# Patient Record
Sex: FEMALE | Race: WHITE | NOT HISPANIC OR LATINO | Employment: FULL TIME | URBAN - METROPOLITAN AREA
[De-identification: names, ages, dates, MRNs, and addresses within clinical notes are randomized per-mention and may not be internally consistent; named-entity substitution may affect disease eponyms.]

---

## 2017-06-23 ENCOUNTER — OFFICE VISIT (OUTPATIENT)
Dept: FAMILY MEDICINE | Facility: CLINIC | Age: 27
End: 2017-06-23
Payer: COMMERCIAL

## 2017-06-23 VITALS
RESPIRATION RATE: 18 BRPM | OXYGEN SATURATION: 97 % | HEIGHT: 64 IN | WEIGHT: 244.8 LBS | HEART RATE: 74 BPM | DIASTOLIC BLOOD PRESSURE: 89 MMHG | SYSTOLIC BLOOD PRESSURE: 135 MMHG | BODY MASS INDEX: 41.79 KG/M2 | TEMPERATURE: 97.8 F

## 2017-06-23 DIAGNOSIS — Z30.41 ENCOUNTER FOR SURVEILLANCE OF CONTRACEPTIVE PILLS: ICD-10-CM

## 2017-06-23 DIAGNOSIS — J30.2 SEASONAL ALLERGIC RHINITIS, UNSPECIFIED ALLERGIC RHINITIS TRIGGER: ICD-10-CM

## 2017-06-23 DIAGNOSIS — B37.2 YEAST INFECTION OF THE SKIN: ICD-10-CM

## 2017-06-23 DIAGNOSIS — Z12.4 SCREENING FOR MALIGNANT NEOPLASM OF CERVIX: ICD-10-CM

## 2017-06-23 DIAGNOSIS — Z11.3 SCREENING EXAMINATION FOR VENEREAL DISEASE: ICD-10-CM

## 2017-06-23 DIAGNOSIS — E66.9 OBESITY, UNSPECIFIED OBESITY SEVERITY, UNSPECIFIED OBESITY TYPE: ICD-10-CM

## 2017-06-23 DIAGNOSIS — J45.20 MILD INTERMITTENT ASTHMA WITHOUT COMPLICATION: ICD-10-CM

## 2017-06-23 DIAGNOSIS — Z23 NEED FOR VACCINATION: ICD-10-CM

## 2017-06-23 DIAGNOSIS — Z00.00 ENCOUNTER FOR ROUTINE ADULT HEALTH EXAMINATION WITHOUT ABNORMAL FINDINGS: Primary | ICD-10-CM

## 2017-06-23 PROCEDURE — 99212 OFFICE O/P EST SF 10 MIN: CPT | Mod: 25 | Performed by: PHYSICIAN ASSISTANT

## 2017-06-23 PROCEDURE — 90715 TDAP VACCINE 7 YRS/> IM: CPT | Performed by: PHYSICIAN ASSISTANT

## 2017-06-23 PROCEDURE — G0145 SCR C/V CYTO,THINLAYER,RESCR: HCPCS | Performed by: PHYSICIAN ASSISTANT

## 2017-06-23 PROCEDURE — 99395 PREV VISIT EST AGE 18-39: CPT | Mod: 25 | Performed by: PHYSICIAN ASSISTANT

## 2017-06-23 PROCEDURE — 90471 IMMUNIZATION ADMIN: CPT | Performed by: PHYSICIAN ASSISTANT

## 2017-06-23 PROCEDURE — 87591 N.GONORRHOEAE DNA AMP PROB: CPT | Performed by: PHYSICIAN ASSISTANT

## 2017-06-23 PROCEDURE — 87491 CHLMYD TRACH DNA AMP PROBE: CPT | Performed by: PHYSICIAN ASSISTANT

## 2017-06-23 RX ORDER — LEVONORGESTREL/ETHIN.ESTRADIOL 0.1-0.02MG
TABLET ORAL
Qty: 28 TABLET | Refills: 11 | Status: SHIPPED | OUTPATIENT
Start: 2017-06-23 | End: 2018-05-25

## 2017-06-23 RX ORDER — ALBUTEROL SULFATE 90 UG/1
2 AEROSOL, METERED RESPIRATORY (INHALATION) EVERY 4 HOURS PRN
Qty: 1 INHALER | Refills: 3 | Status: SHIPPED | OUTPATIENT
Start: 2017-06-23 | End: 2018-05-25

## 2017-06-23 RX ORDER — MONTELUKAST SODIUM 10 MG/1
10 TABLET ORAL AT BEDTIME
Qty: 90 TABLET | Refills: 0 | Status: SHIPPED | OUTPATIENT
Start: 2017-06-23 | End: 2017-10-14

## 2017-06-23 NOTE — PROGRESS NOTES
SUBJECTIVE:     CC: Germania Alcala is an 26 year old woman who presents for preventive health visit.     Healthy Habits:    Do you get at least three servings of calcium containing foods daily (dairy, green leafy vegetables, etc.)? Yes, sometimes.    Amount of exercise or daily activities, outside of work: 3-4 day(s) per week    Problems taking medications regularly No    Medication side effects: No    Have you had an eye exam in the past two years? yes    Do you see a dentist twice per year? yes    Do you have sleep apnea, excessive snoring or daytime drowsiness?no        Additional concern:     Needs OCP refills:  Taking Aviene daily without SE, no breaks in medication, needs refill.      Allergies:  Taking zyrtec daily with only partial relief, continues to have congestion, itchy/watery eyes, sneezing.  Interested in new medication.  Has tried flonase in the past with some relief of congestion.  Has good control of her asthma with albuterol but feels that summer symptoms are worse due to allergens      Today's PHQ-2 Score:   PHQ-2 ( 1999 Pfizer) 6/23/2017 8/5/2016   Q1: Little interest or pleasure in doing things 0 0   Q2: Feeling down, depressed or hopeless 0 0   PHQ-2 Score 0 0       Abuse: Current or Past(Physical, Sexual or Emotional)- No  Do you feel safe in your environment - Yes    Social History   Substance Use Topics     Smoking status: Never Smoker     Smokeless tobacco: Never Used     Alcohol use No     The patient does not drink >3 drinks per day nor >7 drinks per week.    No results for input(s): CHOL, HDL, LDL, TRIG, CHOLHDLRATIO, NHDL in the last 11329 hours.    Reviewed orders with patient.  Reviewed health maintenance and updated orders accordingly - Yes    Mammo Decision Support:  Mammogram not appropriate for this patient based on age.    Pertinent mammograms are reviewed under the imaging tab.  History of abnormal Pap smear: NO - age 21-29 PAP every 3 years recommended    Reviewed and updated  as needed this visit by clinical staff  Tobacco  Allergies  Meds         Reviewed and updated as needed this visit by Provider        Past Medical History:   Diagnosis Date     Allergic rhinitis due to animal dander      Allergy to mold spores     7/8/13 IgE tests pos. only for cat/M (all other environmental allergens NEGATIVE), NEGATIVE IgE for shellfish (crab, lobster, shrimp).     Diagnostic skin and sensitization tests 7/8/13 IgE tests pos. only for cat/M (all other environmental allergens NEGATIVE), NEGATIVE IgE for shellfish (crab, lobster, shrimp).     Intermittent asthma 5/3/2011    follows with primary care.      No past surgical history on file.  Obstetric History     No data available          ROS:  C: NEGATIVE for fever, chills, change in weight  I: NEGATIVE for worrisome rashes, moles or lesions  E: NEGATIVE for vision changes or irritation  ENT: NEGATIVE for ear, mouth and throat problems  R: NEGATIVE for significant cough or SOB  B: NEGATIVE for masses, tenderness or discharge  CV: NEGATIVE for chest pain, palpitations or peripheral edema  GI: NEGATIVE for nausea, abdominal pain, heartburn, or change in bowel habits  : NEGATIVE for unusual urinary or vaginal symptoms. Periods are regular.  M: NEGATIVE for significant arthralgias or myalgia  N: NEGATIVE for weakness, dizziness or paresthesias  P: NEGATIVE for changes in mood or affect    Patient Active Problem List   Diagnosis     Intermittent asthma     Fracture of lateral malleolus of left ankle     Allergy to mold spores     Allergic rhinitis due to animal dander     Diagnostic skin and sensitization tests(aka ALLERGENS)     No past surgical history on file.    Social History   Substance Use Topics     Smoking status: Never Smoker     Smokeless tobacco: Never Used     Alcohol use No     Family History   Problem Relation Age of Onset     Hypertension Father      Asthma Maternal Grandmother      Cancer - colorectal Maternal Grandfather       "C.A.D. No family hx of      DIABETES No family hx of      CEREBROVASCULAR DISEASE No family hx of      Breast Cancer No family hx of      Prostate Cancer No family hx of          Current Outpatient Prescriptions   Medication Sig Dispense Refill     albuterol (PROAIR HFA/PROVENTIL HFA/VENTOLIN HFA) 108 (90 BASE) MCG/ACT Inhaler Inhale 2 puffs into the lungs every 4 hours as needed for shortness of breath / dyspnea 1 Inhaler 3     levonorgestrel-ethinyl estradiol (AVIANE,ALESSE,LESSINA) 0.1-20 MG-MCG per tablet Take 1 tablet po daily 28 tablet 11     montelukast (SINGULAIR) 10 MG tablet Take 1 tablet (10 mg) by mouth At Bedtime 90 tablet 0     cetirizine (ZYRTEC) 10 MG tablet Take 10 mg by mouth daily.       [DISCONTINUED] levonorgestrel-ethinyl estradiol (AVIANE,ALESSE,LESSINA) 0.1-20 MG-MCG per tablet Take 1 tablet by mouth daily PLEASE SCHEDULE AN APPOINTMENT TO RECEIVE FUTURE REFILLS [phone] 28 tablet 11     [DISCONTINUED] albuterol (PROAIR HFA, PROVENTIL HFA, VENTOLIN HFA) 108 (90 BASE) MCG/ACT inhaler Inhale 2 puffs into the lungs every 4 hours as needed for shortness of breath / dyspnea 1 Inhaler 3     Allergies   Allergen Reactions     Cats      Mold      Nkda [No Known Drug Allergies]      No lab results found.   OBJECTIVE:     /89 (BP Location: Left arm, Patient Position: Chair, Cuff Size: Adult Large)  Pulse 74  Temp 97.8  F (36.6  C) (Tympanic)  Resp 18  Ht 5' 4\" (1.626 m)  Wt 244 lb 12.8 oz (111 kg)  LMP 06/09/2017  SpO2 97%  BMI 42.02 kg/m2  EXAM:  GENERAL: healthy, alert and no distress  EYES: Eyes grossly normal to inspection, PERRL and conjunctivae and sclerae normal  HENT: ear canals and TM's normal, nose and mouth without ulcers or lesions  NECK: no adenopathy, no asymmetry, masses, or scars and thyroid normal to palpation  RESP: lungs clear to auscultation - no rales, rhonchi or wheezes  BREAST: normal without masses, tenderness or nipple discharge and no palpable axillary masses " or adenopathy  CV: regular rate and rhythm, normal S1 S2, no S3 or S4, no murmur, click or rub, no peripheral edema and peripheral pulses strong  ABDOMEN: soft, nontender, no hepatosplenomegaly, no masses and bowel sounds normal   (female): skin of inferior labia majora and perineum with diffuse erythema and flaking, satellite papules noted, normal urethral meatus, vaginal mucosa, normal cervix/adnexa/uterus without masses or discharge  MS: no gross musculoskeletal defects noted, no edema  SKIN: no suspicious lesions or rashes  NEURO: Normal strength and tone, mentation intact and speech normal  PSYCH: mentation appears normal, affect normal/bright    ASSESSMENT/PLAN:     1. Encounter for routine adult health examination without abnormal findings    2. Mild intermittent asthma without complication  controlled  - albuterol (PROAIR HFA/PROVENTIL HFA/VENTOLIN HFA) 108 (90 BASE) MCG/ACT Inhaler; Inhale 2 puffs into the lungs every 4 hours as needed for shortness of breath / dyspnea  Dispense: 1 Inhaler; Refill: 3    3. Seasonal allergic rhinitis, unspecified allergic rhinitis trigger  Will add singulare for summer for asthma and allergy,, advise flonase for congestion  - montelukast (SINGULAIR) 10 MG tablet; Take 1 tablet (10 mg) by mouth At Bedtime  Dispense: 90 tablet; Refill: 0    4. Yeast infection of the skin  Rash of perineum and groin noted on examination consistent with skin candidiasis, advise OTC miconazole cream x 7-14 days    5. Encounter for surveillance of contraceptive pills  - levonorgestrel-ethinyl estradiol (AVIANE,ALESSE,LESSINA) 0.1-20 MG-MCG per tablet; Take 1 tablet po daily  Dispense: 28 tablet; Refill: 11    6. Screening for malignant neoplasm of cervix  - Pap imaged thin layer screen only - recommended age 21 - 24 years    7. Screening examination for venereal disease  - Chlamydia trachomatis PCR  - Neisseria gonorrhoeae PCR    8. Need for vaccination  TDAP given      6. Obesity, unspecified  "obesity severity, unspecified obesity type  Plays lacrosse, exercises frequently, will continue to monitor           COUNSELING:   Reviewed preventive health counseling, as reflected in patient instructions       Regular exercise       Healthy diet/nutrition    BP Screening:   Last 3 BP Readings:    BP Readings from Last 3 Encounters:   06/23/17 135/89   08/05/16 127/86   08/12/15 116/74       The following was recommended to the patient:  Re-screen BP within a year and recommended lifestyle modifications     reports that she has never smoked. She has never used smokeless tobacco.    Estimated body mass index is 42.02 kg/(m^2) as calculated from the following:    Height as of this encounter: 5' 4\" (1.626 m).    Weight as of this encounter: 244 lb 12.8 oz (111 kg).   Weight management plan: Discussed healthy diet and exercise guidelines and patient will follow up in 12 months in clinic to re-evaluate.    Counseling Resources:  ATP IV Guidelines  Pooled Cohorts Equation Calculator  Breast Cancer Risk Calculator  FRAX Risk Assessment  ICSI Preventive Guidelines  Dietary Guidelines for Americans, 2010  USDA's MyPlate  ASA Prophylaxis  Lung CA Screening    Jt Ayala PA-C  [facility]  "

## 2017-06-23 NOTE — MR AVS SNAPSHOT
After Visit Summary   6/23/2017    Germania Alcala    MRN: [id number]           Patient Information     Date Of Birth          1990        Visit Information        Provider Department      6/23/2017 1:40 PM Jt Ayala PA-C [facility]        Today's Diagnoses     Encounter for routine adult health examination without abnormal findings    -  1    Mild intermittent asthma without complication        Yeast infection of the skin        Encounter for surveillance of contraceptive pills        Screening for malignant neoplasm of cervix        Need for vaccination        Seasonal allergic rhinitis, unspecified allergic rhinitis trigger        Screening examination for venereal disease          Care Instructions      Preventive Health Recommendations  Female Ages 18 to 25     Yearly exam:     See your health care provider every year in order to  o Review health changes.   o Discuss preventive care.    o Review your medicines if your doctor has prescribed any.      You should be tested each year for STDs (sexually transmitted diseases).       After age 20, talk to your provider about how often you should have cholesterol testing.      Starting at age 21, get a Pap test every three years. If you have an abnormal result, your doctor may have you test more often.      If you are at risk for diabetes, you should have a diabetes test (fasting glucose).     Shots:     Get a flu shot each year.     Get a tetanus shot every 10 years.     Consider getting the shot (vaccine) that prevents cervical cancer (Gardasil).    Nutrition:     Eat at least 5 servings of fruits and vegetables each day.    Eat whole-grain bread, whole-wheat pasta and brown rice instead of white grains and rice.    Talk to your provider about Calcium and Vitamin D.     Lifestyle    Exercise at least 150 minutes a week each week (30 minutes a day, 5 days a week). This will help you control your weight and prevent  "disease.    Limit alcohol to one drink per day.    No smoking.     Wear sunscreen to prevent skin cancer.    See your dentist every six months for an exam and cleaning.          Follow-ups after your visit        Who to contact     If you have questions or need follow up information about today's clinic visit or your schedule please contact [facility] directly at [phone].  Normal or non-critical lab and imaging results will be communicated to you by Crocodochart, letter or phone within 4 business days after the clinic has received the results. If you do not hear from us within 7 days, please contact the clinic through fl3urt or phone. If you have a critical or abnormal lab result, we will notify you by phone as soon as possible.  Submit refill requests through Avansera or call your pharmacy and they will forward the refill request to us. Please allow 3 business days for your refill to be completed.          Additional Information About Your Visit        Crocodochart Information     Avansera gives you secure access to your electronic health record. If you see a primary care provider, you can also send messages to your care team and make appointments. If you have questions, please call your primary care clinic.  If you do not have a primary care provider, please call [phone] and they will assist you.        Care EveryWhere ID     This is your Care EveryWhere ID. This could be used by other organizations to access your [facility] medical records  [id number]        Your Vitals Were     Pulse Temperature Respirations Height Last Period Pulse Oximetry    74 97.8  F (36.6  C) (Tympanic) 18 5' 4\" (1.626 m) 06/09/2017 97%    BMI (Body Mass Index)                   42.02 kg/m2            Blood Pressure from Last 3 Encounters:   06/23/17 135/89   08/05/16 127/86   08/12/15 116/74    Weight from Last 3 Encounters:   06/23/17 244 lb 12.8 oz (111 kg)   08/05/16 234 lb (106.1 kg)   08/12/15 201 lb 3.2 oz (91.3 " kg)              We Performed the Following     Chlamydia trachomatis PCR     Neisseria gonorrhoeae PCR     Pap imaged thin layer screen only - recommended age 21 - 24 years          Today's Medication Changes          These changes are accurate as of: 6/23/17  2:38 PM.  If you have any questions, ask your nurse or doctor.               Start taking these medicines.        Dose/Directions    montelukast 10 MG tablet   Commonly known as:  SINGULAIR   Used for:  Seasonal allergic rhinitis, unspecified allergic rhinitis trigger   Started by:  Jt Ayala PA-C        Dose:  10 mg   Take 1 tablet (10 mg) by mouth At Bedtime   Quantity:  90 tablet   Refills:  0         These medicines have changed or have updated prescriptions.        Dose/Directions    levonorgestrel-ethinyl estradiol 0.1-20 MG-MCG per tablet   Commonly known as:  ROBERT PEREZ LESSINA   This may have changed:    - how much to take  - how to take this  - when to take this  - additional instructions   Used for:  Encounter for surveillance of contraceptive pills   Changed by:  Jt Ayala PA-C        Take 1 tablet po daily   Quantity:  28 tablet   Refills:  11            Where to get your medicines      These medications were sent to [facility] [id number] [facility] - [facility], [place], [place] - [address]  [address] [place] [facility] [place] [address]     Phone:  [phone]     albuterol 108 (90 BASE) MCG/ACT Inhaler    levonorgestrel-ethinyl estradiol 0.1-20 MG-MCG per tablet    montelukast 10 MG tablet                Primary Care Provider Office Phone # Fax #    Jose Maria Janett Manjarrez [phone]       [address] [address]        Equal Access to Services     [facility]: Kem García, waricardo ludereje, qaybtk lopezalkristine curtis, jasper yadav. So [facility] [phone].    ATENCIÓN: Si habla español, tiene a solitario disposición  servicios gratuitos de asistencia lingüística. Shahla barreto [phone].    We comply with applicable federal civil rights laws and [place] laws. We do not discriminate on the basis of race, color, national origin, age, disability sex, sexual orientation or gender identity.            Thank you!     Thank you for choosing [facility] [place] [facility]  for your care. Our goal is always to provide you with excellent care. Hearing back from our patients is one way we can continue to improve our services. Please take a few minutes to complete the written survey that you may receive in the mail after your visit with us. Thank you!             Your Updated Medication List - Protect others around you: Learn how to safely use, store and throw away your medicines at www.disposemymeds.org.          This list is accurate as of: 6/23/17  2:38 PM.  Always use your most recent med list.                   Brand Name Dispense Instructions for use Diagnosis    albuterol 108 (90 BASE) MCG/ACT Inhaler    PROAIR HFA/PROVENTIL HFA/VENTOLIN HFA    1 Inhaler    Inhale 2 puffs into the lungs every 4 hours as needed for shortness of breath / dyspnea    Mild intermittent asthma without complication       cetirizine 10 MG tablet    zyrTEC     Take 10 mg by mouth daily.        levonorgestrel-ethinyl estradiol 0.1-20 MG-MCG per tablet    ROBERT PEREZ LESSINA    28 tablet    Take 1 tablet po daily    Encounter for surveillance of contraceptive pills       montelukast 10 MG tablet    SINGULAIR    90 tablet    Take 1 tablet (10 mg) by mouth At Bedtime    Seasonal allergic rhinitis, unspecified allergic rhinitis trigger

## 2017-06-23 NOTE — NURSING NOTE
"Chief Complaint   Patient presents with     Physical     Not fasting        Initial /89 (BP Location: Left arm, Patient Position: Chair, Cuff Size: Adult Large)  Pulse 74  Temp 97.8  F (36.6  C) (Tympanic)  Resp 18  Ht 5' 4\" (1.626 m)  Wt 244 lb 12.8 oz (111 kg)  LMP 06/09/2017  SpO2 97%  BMI 42.02 kg/m2 Estimated body mass index is 42.02 kg/(m^2) as calculated from the following:    Height as of this encounter: 5' 4\" (1.626 m).    Weight as of this encounter: 244 lb 12.8 oz (111 kg).  Medication Reconciliation: complete    Jane Boyd MA  "

## 2017-06-24 ASSESSMENT — ASTHMA QUESTIONNAIRES: ACT_TOTALSCORE: 23

## 2017-06-25 LAB
C TRACH DNA SPEC QL NAA+PROBE: NORMAL
N GONORRHOEA DNA SPEC QL NAA+PROBE: NORMAL
SPECIMEN SOURCE: NORMAL
SPECIMEN SOURCE: NORMAL

## 2017-06-27 LAB
COPATH REPORT: NORMAL
PAP: NORMAL

## 2017-07-01 DIAGNOSIS — Z30.09 GENERAL COUNSELING FOR PRESCRIPTION OF ORAL CONTRACEPTIVES: ICD-10-CM

## 2017-07-03 RX ORDER — LEVONORGESTREL/ETHIN.ESTRADIOL 0.1-0.02MG
TABLET ORAL
Qty: 28 TABLET | Refills: 11 | OUTPATIENT
Start: 2017-07-03

## 2017-07-03 NOTE — TELEPHONE ENCOUNTER
levonorgestrel-ethinyl estradiol (AVIANE,ROBERT,LESSINA) 0.1-20 MG-MCG per tablet      Last Written Prescription Date: 6/23/2017  Last Fill Quantity: 28, # refills: 11  Last Office Visit with [facility], [facility] or [facility] prescribing provider: 6/23/2017       BP Readings from Last 3 Encounters:   06/23/17 135/89   08/05/16 127/86   08/12/15 116/74     Date of last Breast Exam: 6/23/2017

## 2017-07-03 NOTE — TELEPHONE ENCOUNTER
Duplicate- sent 06/23/17- info sent to pharmacy.  Ruby Salvador,RN  [facility]  [phone]

## 2017-10-14 DIAGNOSIS — J30.2 SEASONAL ALLERGIC RHINITIS: ICD-10-CM

## 2017-10-16 RX ORDER — MONTELUKAST SODIUM 10 MG/1
TABLET ORAL
Qty: 90 TABLET | Refills: 2 | Status: SHIPPED | OUTPATIENT
Start: 2017-10-16 | End: 2018-05-25

## 2017-10-16 NOTE — TELEPHONE ENCOUNTER
Prescription approved per [facility], [facility] or [facility] refill protocol.  Zuleyka Marinelli RN - Triage  [facility]

## 2017-10-16 NOTE — TELEPHONE ENCOUNTER
Singulair       Last Written Prescription Date: 6/23/17  Last Fill Quantity: 90, # refills: 0    Last Office Visit with [facility], P or [facility] prescribing provider:  6/23/17   Future Office Visit:       Date of Last Asthma Action Plan Letter:   Asthma Action Plan Q1 Year    Topic Date Due     Asthma Action Plan - yearly  08/12/2016      Asthma Control Test:   ACT Total Scores 6/23/2017   ACT TOTAL SCORE -   ASTHMA ER VISITS -   ASTHMA HOSPITALIZATIONS -   ACT TOTAL SCORE (Goal Greater than or Equal to 20) 23   In the past 12 months, how many times did you visit the emergency room for your asthma without being admitted to the hospital? 0   In the past 12 months, how many times were you hospitalized overnight because of your asthma? 0       Date of Last Spirometry Test:   No results found for this or any previous visit.    Perla Pelletier CMA

## 2018-02-13 ENCOUNTER — OFFICE VISIT (OUTPATIENT)
Dept: DERMATOLOGY | Facility: CLINIC | Age: 28
End: 2018-02-13
Payer: COMMERCIAL

## 2018-02-13 VITALS
DIASTOLIC BLOOD PRESSURE: 70 MMHG | SYSTOLIC BLOOD PRESSURE: 136 MMHG | HEIGHT: 64 IN | BODY MASS INDEX: 41.66 KG/M2 | HEART RATE: 78 BPM | WEIGHT: 244 LBS

## 2018-02-13 DIAGNOSIS — L81.4 LENTIGO: ICD-10-CM

## 2018-02-13 DIAGNOSIS — D18.00 ANGIOMA: ICD-10-CM

## 2018-02-13 DIAGNOSIS — D22.9 NEVUS: Primary | ICD-10-CM

## 2018-02-13 PROCEDURE — 99203 OFFICE O/P NEW LOW 30 MIN: CPT | Performed by: PHYSICIAN ASSISTANT

## 2018-02-13 ASSESSMENT — PAIN SCALES - GENERAL: PAINLEVEL: NO PAIN (0)

## 2018-02-13 NOTE — NURSING NOTE
"Chief Complaint   Patient presents with     [facility]     Derm Problem     mole check / full body       Initial /70  Pulse 78  Ht 1.626 m (5' 4\")  Wt 110.7 kg (244 lb)  BMI 41.88 kg/m2 Estimated body mass index is 41.88 kg/(m^2) as calculated from the following:    Height as of this encounter: 1.626 m (5' 4\").    Weight as of this encounter: 110.7 kg (244 lb).  Medication Reconciliation: complete    "

## 2018-02-13 NOTE — PROGRESS NOTES
"HPI:   Germania Alcala is a 27 year old female who presents for Full skin cancer screening.  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   On review of systems, there are no further skin complaints, patient is feeling otherwise well.  See patient intake sheet.  ROS of the following were done and are negative: Constitutional, Eyes, Ears, Nose,   Mouth, Throat, Cardiovascular, Respiratory, GI, Genitourinary, Musculoskeletal,   Psychiatric, Endocrine, Allergic/Immunologic.    This document serves as a record of the services and decisions personally performed and made by Rosalie Camilo, MS, PA-C. It was created on her behalf by Michelle Ervin, a trained medical scribe. The creation of this document is based on the provider's statements to the medical scribe.  Michelle Ervin 3:56 PM February 13, 2018    PHYSICAL EXAM:   /70  Pulse 78  Ht 1.626 m (5' 4\")  Wt 110.7 kg (244 lb)  BMI 41.88 kg/m2   Skin exam performed as follows: Type 2 skin. Mood appropriate  Alert and Oriented X 3. Well developed, well nourished in no distress.  General appearance: Normal  Head including face: Normal  Eyes: conjunctiva and lids: Normal  Mouth: Lips, teeth, gums: Normal  Neck: Normal  Chest-breast/axillae: Normal  Back: Normal  Spleen and liver: Normal  Cardiovascular: Exam of peripheral vascular system by observation for swelling, varicosities, edema: Normal  Genitalia: groin, buttocks: Normal  Extremities: digits/nails (clubbing): Normal  Eccrine and Apocrine glands: Normal  Right upper extremity: Normal  Left upper extremity: " Normal  Right lower extremity: Normal  Left lower extremity: Normal  Skin: Scalp and body hair: See below    Pt deferred exam of breasts, groin, buttocks: No    Other physical findings:  1. Multiple pigmented macules on extremities and trunk  2. Multiple pigmented macules on face, trunk and extremities  3. Multiple vascular papules on trunk, arms and legs       Except as noted above, no other signs of skin cancer or melanoma.     ASSESSMENT/PLAN:   Benign Full skin cancer screening today. . Patient with history of none  Advised on monthly self exams and 1 year  Patient Education: Appropriate brochures given.    Multiple benign appearing nevi on arms, legs and trunk. Discussed ABCDEs of melanoma and sunscreen.   Multiple lentigos on arms, legs and trunk. Advised benign, no treatment needed.  Multiple scattered angiomas. Advised benign, no treatment needed.       Follow-up: Q2-3 years for FSE; PRN sooner     1.) Patient was asked about new and changing moles. YES  2.) Patient received a complete physical skin examination: YES  3.) Patient was counseled to perform a monthly self skin examination: YES  Scribed By: Michelle Ervin Medical Scribe     The information in this document, created by the medical scribe for me, accurately reflects the services I personally performed and the decisions made by me. I have reviewed and approved this document for accuracy prior to leaving the patient care area.  February 13, 2018 4:02 PM    Rosalie Camilo MS, PALatoniaC

## 2018-02-13 NOTE — LETTER
"    2/13/2018         RE: Germania Alcala  [address]   [place] [address]        Dear Colleague,    Thank you for referring your patient, Germania Alcala, to the [facility]. Please see a copy of my visit note below.    HPI:   Germania Alcala is a 27 year old female who presents for Full skin cancer screening.  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   On review of systems, there are no further skin complaints, patient is feeling otherwise well.  See patient intake sheet.  ROS of the following were done and are negative: Constitutional, Eyes, Ears, Nose,   Mouth, Throat, Cardiovascular, Respiratory, GI, Genitourinary, Musculoskeletal,   Psychiatric, Endocrine, Allergic/Immunologic.    This document serves as a record of the services and decisions personally performed and made by Rosalie Camilo, MS, PA-C. It was created on her behalf by Michelle Ervin, a trained medical scribe. The creation of this document is based on the provider's statements to the medical scribe.  Michelle Ervin 3:56 PM February 13, 2018    PHYSICAL EXAM:   /70  Pulse 78  Ht 1.626 m (5' 4\")  Wt 110.7 kg (244 lb)  BMI 41.88 kg/m2   Skin exam performed as follows: Type 2 skin. Mood appropriate  Alert and Oriented X 3. Well developed, well nourished in no distress.  General appearance: Normal  Head including face: Normal  Eyes: conjunctiva and lids: Normal  Mouth: Lips, teeth, gums: Normal  Neck: Normal  Chest-breast/axillae: Normal  Back: Normal  Spleen and liver: Normal  Cardiovascular: Exam of peripheral vascular " system by observation for swelling, varicosities, edema: Normal  Genitalia: groin, buttocks: Normal  Extremities: digits/nails (clubbing): Normal  Eccrine and Apocrine glands: Normal  Right upper extremity: Normal  Left upper extremity: Normal  Right lower extremity: Normal  Left lower extremity: Normal  Skin: Scalp and body hair: See below    Pt deferred exam of breasts, groin, buttocks: No    Other physical findings:  1. Multiple pigmented macules on extremities and trunk  2. Multiple pigmented macules on face, trunk and extremities  3. Multiple vascular papules on trunk, arms and legs       Except as noted above, no other signs of skin cancer or melanoma.     ASSESSMENT/PLAN:   Benign Full skin cancer screening today. . Patient with history of none  Advised on monthly self exams and 1 year  Patient Education: Appropriate brochures given.    Multiple benign appearing nevi on arms, legs and trunk. Discussed ABCDEs of melanoma and sunscreen.   Multiple lentigos on arms, legs and trunk. Advised benign, no treatment needed.  Multiple scattered angiomas. Advised benign, no treatment needed.       Follow-up: Q2-3 years for FSE; PRN sooner     1.) Patient was asked about new and changing moles. YES  2.) Patient received a complete physical skin examination: YES  3.) Patient was counseled to perform a monthly self skin examination: YES  Scribed By: Michelle Ervin Medical Scribe     The information in this document, created by the medical scribe for me, accurately reflects the services I personally performed and the decisions made by me. I have reviewed and approved this document for accuracy prior to leaving the patient care area.  February 13, 2018 4:02 PM    Rosalie Camilo MS, PACELESTE      Again, thank you for allowing me to participate in the care of your patient.        Sincerely,        Rosalie Camilo PA-C

## 2018-02-13 NOTE — MR AVS SNAPSHOT
"              After Visit Summary   2/13/2018    Germania Alcala    MRN: [id number]           Patient Information     Date Of Birth          1990        Visit Information        Provider Department      2/13/2018 4:00 PM Rosalie Camilo PA-C [facility]        Today's Diagnoses     Nevus    -  1    Lentigo        Angioma           Follow-ups after your visit        Who to contact     If you have questions or need follow up information about today's clinic visit or your schedule please contact [facility] directly at [phone].  Normal or non-critical lab and imaging results will be communicated to you by ScoreGridhart, letter or phone within 4 business days after the clinic has received the results. If you do not hear from us within 7 days, please contact the clinic through ScoreGridhart or phone. If you have a critical or abnormal lab result, we will notify you by phone as soon as possible.  Submit refill requests through Vantos or call your pharmacy and they will forward the refill request to us. Please allow 3 business days for your refill to be completed.          Additional Information About Your Visit        MyChart Information     Vantos gives you secure access to your electronic health record. If you see a primary care provider, you can also send messages to your care team and make appointments. If you have questions, please call your primary care clinic.  If you do not have a primary care provider, please call [phone] and they will assist you.        Care EveryWhere ID     This is your Care EveryWhere ID. This could be used by other organizations to access your [facility] medical records  [id number]        Your Vitals Were     Pulse Height BMI (Body Mass Index)             78 1.626 m (5' 4\") 41.88 kg/m2          Blood Pressure from Last 3 Encounters:   02/13/18 136/70   06/23/17 135/89   08/05/16 127/86    Weight from Last 3 Encounters:   02/13/18 110.7 kg " (244 lb)   06/23/17 111 kg (244 lb 12.8 oz)   08/05/16 106.1 kg (234 lb)              Today, you had the following     No orders found for display       Primary Care Provider Office Phone # Fax #    Jose Maria Janett Manjarrez [phone] [address]  [place] [address]        Equal Access to Services     [facility]: Hadii aad ku hadasho Soomaali, waaxda luqadaha, qaybta kaalmada adeegyada, waxay idiin hayaan adeeg kharash la'aan . So [facility] [phone].    ATENCIÓN: Si petty thompson, tiene a solitario disposición servicios gratuitos de asistencia lingüística. Llame al [phone].    We comply with applicable federal civil rights laws and [place] laws. We do not discriminate on the basis of race, color, national origin, age, disability, sex, sexual orientation, or gender identity.            Thank you!     Thank you for choosing [facility]  for your care. Our goal is always to provide you with excellent care. Hearing back from our patients is one way we can continue to improve our services. Please take a few minutes to complete the written survey that you may receive in the mail after your visit with us. Thank you!             Your Updated Medication List - Protect others around you: Learn how to safely use, store and throw away your medicines at www.disposemymeds.org.          This list is accurate as of 2/13/18  5:09 PM.  Always use your most recent med list.                   Brand Name Dispense Instructions for use Diagnosis    albuterol 108 (90 BASE) MCG/ACT Inhaler    PROAIR HFA/PROVENTIL HFA/VENTOLIN HFA    1 Inhaler    Inhale 2 puffs into the lungs every 4 hours as needed for shortness of breath / dyspnea    Mild intermittent asthma without complication       cetirizine 10 MG tablet    zyrTEC     Take 10 mg by mouth daily.        levonorgestrel-ethinyl estradiol 0.1-20 MG-MCG per tablet    ROBERT PEREZ LESSINA    28 tablet    Take 1 tablet po daily     Encounter for surveillance of contraceptive pills       montelukast 10 MG tablet    SINGULAIR    90 tablet    TAKE 1 TABLET (10 MG) BY MOUTH AT BEDTIME    Seasonal allergic rhinitis

## 2018-05-25 ENCOUNTER — OFFICE VISIT (OUTPATIENT)
Dept: FAMILY MEDICINE | Facility: CLINIC | Age: 28
End: 2018-05-25
Payer: COMMERCIAL

## 2018-05-25 VITALS
BODY MASS INDEX: 44.25 KG/M2 | DIASTOLIC BLOOD PRESSURE: 76 MMHG | OXYGEN SATURATION: 97 % | WEIGHT: 259.2 LBS | TEMPERATURE: 97.7 F | RESPIRATION RATE: 16 BRPM | SYSTOLIC BLOOD PRESSURE: 120 MMHG | HEIGHT: 64 IN | HEART RATE: 57 BPM

## 2018-05-25 DIAGNOSIS — Z11.3 SCREEN FOR STD (SEXUALLY TRANSMITTED DISEASE): Primary | ICD-10-CM

## 2018-05-25 DIAGNOSIS — Z30.41 ENCOUNTER FOR SURVEILLANCE OF CONTRACEPTIVE PILLS: ICD-10-CM

## 2018-05-25 DIAGNOSIS — J45.20 MILD INTERMITTENT ASTHMA WITHOUT COMPLICATION: ICD-10-CM

## 2018-05-25 PROCEDURE — 87591 N.GONORRHOEAE DNA AMP PROB: CPT | Performed by: PHYSICIAN ASSISTANT

## 2018-05-25 PROCEDURE — 87491 CHLMYD TRACH DNA AMP PROBE: CPT | Performed by: PHYSICIAN ASSISTANT

## 2018-05-25 PROCEDURE — 99213 OFFICE O/P EST LOW 20 MIN: CPT | Performed by: PHYSICIAN ASSISTANT

## 2018-05-25 RX ORDER — LEVONORGESTREL/ETHIN.ESTRADIOL 0.1-0.02MG
TABLET ORAL
Qty: 28 TABLET | Refills: 11 | Status: SHIPPED | OUTPATIENT
Start: 2018-05-25 | End: 2019-05-05

## 2018-05-25 RX ORDER — ALBUTEROL SULFATE 90 UG/1
2 AEROSOL, METERED RESPIRATORY (INHALATION) EVERY 4 HOURS PRN
Qty: 1 INHALER | Refills: 3 | Status: SHIPPED | OUTPATIENT
Start: 2018-05-25 | End: 2019-07-19

## 2018-05-25 NOTE — PROGRESS NOTES
SUBJECTIVE:   Germania Alcala is a 27 year old female who presents to clinic today for the following health issues:    Medication Followup of Levonorgestrel-ethinyl    Taking Medication as prescribed: yes    Side Effects:  Menstrual cycle has been less than a day or just one day    Medication Helping Symptoms:  yes       Patient taking OCP consistently, no breaks in medication, due for refills at this time        Problem list and histories reviewed & adjusted, as indicated.  Additional history: as documented    Patient Active Problem List   Diagnosis     Intermittent asthma     Fracture of lateral malleolus of left ankle     Allergy to mold spores     Allergic rhinitis due to animal dander     Diagnostic skin and sensitization tests(aka ALLERGENS)     Class 3 obesity without serious comorbidity with body mass index (BMI) of 40.0 to 44.9 in adult (H)     No past surgical history on file.    Social History   Substance Use Topics     Smoking status: Never Smoker     Smokeless tobacco: Never Used     Alcohol use No     Family History   Problem Relation Age of Onset     Hypertension Father      Asthma Maternal Grandmother      Cancer - colorectal Maternal Grandfather      C.A.D. No family hx of      DIABETES No family hx of      CEREBROVASCULAR DISEASE No family hx of      Breast Cancer No family hx of      Prostate Cancer No family hx of          Current Outpatient Prescriptions   Medication Sig Dispense Refill     albuterol (PROAIR HFA/PROVENTIL HFA/VENTOLIN HFA) 108 (90 Base) MCG/ACT Inhaler Inhale 2 puffs into the lungs every 4 hours as needed for shortness of breath / dyspnea 1 Inhaler 3     cetirizine (ZYRTEC) 10 MG tablet Take 10 mg by mouth daily.       levonorgestrel-ethinyl estradiol (AVIANE,ALESSE,LESSINA) 0.1-20 MG-MCG per tablet Take 1 tablet po daily 28 tablet 11     [DISCONTINUED] albuterol (PROAIR HFA/PROVENTIL HFA/VENTOLIN HFA) 108 (90 BASE) MCG/ACT Inhaler Inhale 2 puffs into the lungs every 4 hours as  "needed for shortness of breath / dyspnea 1 Inhaler 3     [DISCONTINUED] levonorgestrel-ethinyl estradiol (AVIANE,ALESSE,LESSINA) 0.1-20 MG-MCG per tablet Take 1 tablet po daily 28 tablet 11     Allergies   Allergen Reactions     Cats      Mold      Nkda [No Known Drug Allergies]        Reviewed and updated as needed this visit by clinical staff       Reviewed and updated as needed this visit by Provider         ROS:  Constitutional, HEENT, cardiovascular, pulmonary, gi and gu systems are negative, except as otherwise noted.    OBJECTIVE:     /76  Pulse 57  Temp 97.7  F (36.5  C) (Tympanic)  Resp 16  Ht 5' 4\" (1.626 m)  Wt 259 lb 3.2 oz (117.6 kg)  LMP 05/06/2018  SpO2 97%  BMI 44.49 kg/m2  Body mass index is 44.49 kg/(m^2).  GENERAL: healthy, alert and no distress  EYES: Eyes grossly normal to inspection, PERRL and conjunctivae and sclerae normal  HENT: ear canals and TM's normal, nose and mouth without ulcers or lesions  NECK: no adenopathy  RESP: lungs clear to auscultation - no rales, rhonchi or wheezes  CV: regular rate and rhythm, normal S1 S2, no S3 or S4, no murmur, click or rub    Diagnostic Test Results:  none     ASSESSMENT/PLAN:       1. Encounter for surveillance of contraceptive pills  Refilled x 1 year  - levonorgestrel-ethinyl estradiol (AVIANE,ALESSE,LESSINA) 0.1-20 MG-MCG per tablet; Take 1 tablet po daily  Dispense: 28 tablet; Refill: 11    2. Mild intermittent asthma without complication  Controlled, no concerns at this time, see ACT  - albuterol (PROAIR HFA/PROVENTIL HFA/VENTOLIN HFA) 108 (90 Base) MCG/ACT Inhaler; Inhale 2 puffs into the lungs every 4 hours as needed for shortness of breath / dyspnea  Dispense: 1 Inhaler; Refill: 3    3. Screen for STD (sexually transmitted disease)  - Chlamydia trachomatis PCR  - Neisseria gonorrhoeae PCR    See Patient Instructions    Jt Ayala PA-C  [facility]  "

## 2018-05-25 NOTE — MR AVS SNAPSHOT
After Visit Summary   5/25/2018    Germania Alcala    MRN: [id number]           Patient Information     Date Of Birth          1990        Visit Information        Provider Department      5/25/2018 1:20 PM Jt Ayala PA-C [facility]        Today's Diagnoses     Screen for STD (sexually transmitted disease)    -  1    Encounter for surveillance of contraceptive pills        Mild intermittent asthma without complication           Follow-ups after your visit        Follow-up notes from your care team     Return in about 1 year (around 5/25/2019) for Physical Exam.      Who to contact     If you have questions or need follow up information about today's clinic visit or your schedule please contact [facility] directly at [phone].  Normal or non-critical lab and imaging results will be communicated to you by MyChart, letter or phone within 4 business days after the clinic has received the results. If you do not hear from us within 7 days, please contact the clinic through MyChart or phone. If you have a critical or abnormal lab result, we will notify you by phone as soon as possible.  Submit refill requests through Quintiles or call your pharmacy and they will forward the refill request to us. Please allow 3 business days for your refill to be completed.          Additional Information About Your Visit        MyChart Information     Quintiles gives you secure access to your electronic health record. If you see a primary care provider, you can also send messages to your care team and make appointments. If you have questions, please call your primary care clinic.  If you do not have a primary care provider, please call [phone] and they will assist you.        Care EveryWhere ID     This is your Care EveryWhere ID. This could be used by other organizations to access your [facility] medical records  [id number]        Your Vitals Were     Pulse  "Temperature Respirations Height Last Period Pulse Oximetry    57 97.7  F (36.5  C) (Tympanic) 16 5' 4\" (1.626 m) 05/06/2018 97%    BMI (Body Mass Index)                   44.49 kg/m2            Blood Pressure from Last 3 Encounters:   05/25/18 120/76   02/13/18 136/70   06/23/17 135/89    Weight from Last 3 Encounters:   05/25/18 259 lb 3.2 oz (117.6 kg)   02/13/18 244 lb (110.7 kg)   06/23/17 244 lb 12.8 oz (111 kg)              We Performed the Following     Chlamydia trachomatis PCR     Neisseria gonorrhoeae PCR          Where to get your medicines      These medications were sent to [place] [address] [place] [address] [address]     Phone:  [phone]     albuterol 108 (90 Base) MCG/ACT Inhaler    levonorgestrel-ethinyl estradiol 0.1-20 MG-MCG per tablet          Primary Care Provider Office Phone # Fax #    Jose Maria Janett Manjarrez [phone]       [address] [place] [address]        Equal Access to Services     [facility]: Hadii angel alvarado hadkayao Soalleyali, waaxda luqadaha, qaybta kaalmada adeegyada, jasper yadav. So [facility] [phone].    ATENCIÓN: Si habla español, tiene a solitario disposición servicios gratuitos de asistencia lingüística. Llame al [phone].    We comply with applicable federal civil rights laws and [place] laws. We do not discriminate on the basis of race, color, national origin, age, disability, sex, sexual orientation, or gender identity.            Thank you!     Thank you for choosing [facility]  for your care. Our goal is always to provide you with excellent care. Hearing back from our patients is one way we can continue to improve our services. Please take a few minutes to complete the written survey that you may receive in the mail after your visit with us. Thank you!             Your Updated Medication List - Protect others around you: Learn how to " safely use, store and throw away your medicines at www.disposemymeds.org.          This list is accurate as of 5/25/18  3:29 PM.  Always use your most recent med list.                   Brand Name Dispense Instructions for use Diagnosis    albuterol 108 (90 Base) MCG/ACT Inhaler    PROAIR HFA/PROVENTIL HFA/VENTOLIN HFA    1 Inhaler    Inhale 2 puffs into the lungs every 4 hours as needed for shortness of breath / dyspnea    Mild intermittent asthma without complication       cetirizine 10 MG tablet    zyrTEC     Take 10 mg by mouth daily.        levonorgestrel-ethinyl estradiol 0.1-20 MG-MCG per tablet    ROBERT PEREZ LESSINA    28 tablet    Take 1 tablet po daily    Encounter for surveillance of contraceptive pills

## 2018-05-26 ASSESSMENT — ASTHMA QUESTIONNAIRES: ACT_TOTALSCORE: 23

## 2018-05-27 LAB
C TRACH DNA SPEC QL NAA+PROBE: NEGATIVE
N GONORRHOEA DNA SPEC QL NAA+PROBE: NEGATIVE
SPECIMEN SOURCE: NORMAL
SPECIMEN SOURCE: NORMAL

## 2018-05-29 NOTE — PROGRESS NOTES
Germania-  Here are your recent results.         -Chlamydia and gonnohrea tests are normal.    If you have any questions please do not hesitate to contact our office via phone ([phone]) or you may send me a message via StoreAge by clicking the contact my Care Team link.      It was a pleasure  participating in your care!    Thank you,    Jt Ayala MPH, PA-C  [address] [place] [address] [phone]

## 2018-06-08 ENCOUNTER — TELEPHONE (OUTPATIENT)
Dept: FAMILY MEDICINE | Facility: CLINIC | Age: 28
End: 2018-06-08

## 2018-06-08 NOTE — TELEPHONE ENCOUNTER
Left non-detailed message to call the clinic back at [phone] and ask to speak with a  triage nurse.   Jess Slaughter RN

## 2018-06-08 NOTE — TELEPHONE ENCOUNTER
Patient states that she cut her hand Monday on glass on Monday received stitches at [facility] on side of right hand by pinky and 2 in the palm of hand.  Notes that in the last day her pinky has been a little numb.  States that it feels like it is at sleep.  Denies completely numb but notes there I and notes there is more of a  decrease in sensation on entire finger.     Denies: color, temperature, swelling, drainage, difficulties or changes with range of motion to finger.    Please advise with recommendations,  Zuleyka Marinelli RN - Triage  [facility]

## 2018-06-08 NOTE — TELEPHONE ENCOUNTER
PT called back to speak with triage, RN unavailable would like another call back.     PH: [phone]  Ok to leave message    Luzmaria Quiroz  Patient Representative - [facility]

## 2018-06-08 NOTE — TELEPHONE ENCOUNTER
Reason for call:  Patient reporting a symptom    Symptom or request: Numbness in pinky finger     Duration (how long have symptoms been present): yesterday    Have you been treated for this before? Yes    Additional comments: Pt cut hand & got stitches on side of hand. Now has the numbness forming    Phone Number patient can be reached at:  Cell number on file:    Telephone Information:   Mobile [phone]       Best Time:  any    Can we leave a detailed message on this number:  YES    Call taken on 6/8/2018 at 11:15 AM by Elmira Garcia

## 2018-06-08 NOTE — TELEPHONE ENCOUNTER
Sometimes when the skin is lacerated some of the superficial nerve endings can be damaged and can cause this sensation of numbness around the laceration.  This should improve as the cut heals.  She can be seen next week to have these removed ( 7-10 days from time of laceration) and we can check at this time.  If the sensation is getting worse or she is unable to move of feel her finger, she should be seen sooner

## 2018-06-08 NOTE — TELEPHONE ENCOUNTER
Left non-detailed message to call the clinic back at [phone] and ask to speak with a  triage nurse.   Jess Slaughter RN

## 2018-06-09 DIAGNOSIS — Z30.41 ENCOUNTER FOR SURVEILLANCE OF CONTRACEPTIVE PILLS: ICD-10-CM

## 2018-06-11 DIAGNOSIS — Z30.41 ENCOUNTER FOR SURVEILLANCE OF CONTRACEPTIVE PILLS: ICD-10-CM

## 2018-06-11 RX ORDER — LEVONORGESTREL AND ETHINYL ESTRADIOL 0.1-0.02MG
KIT ORAL
Qty: 28 TABLET | Refills: 8 | OUTPATIENT
Start: 2018-06-11

## 2018-06-11 NOTE — TELEPHONE ENCOUNTER
"Requested Prescriptions   Pending Prescriptions Disp Refills     LARISSIA 0.1-20 MG-MCG per tablet [Pharmacy Med Name: LARISSIA-28 TABLET]  New Pharmacy  Previously Prescribed:  levonorgestrel-ethinyl estradiol (AVIANE,ALESSE,LESSINA) 0.1-20 MG-MCG per tablet  Last Written Prescription Date:  5/25/2018  Last Fill Quantity: 28 tablet,  # refills: 11   Last office visit: 5/25/2018 with prescribing provider:  Jamie     Future Office Visit:   Next 5 appointments (look out 90 days)     Shane 15, 2018  3:40 PM CDT   MyChart Injury Follow Up with Rosalie Rai MD   [facility] ([address] [address] [phone]                  28 tablet 8     Sig: TAKE 1 TABLET BY MOUTH DAILY    Contraceptives Protocol Passed    6/11/2018  1:21 PM       Passed - Patient is not a current smoker if age is 35 or older       Passed - Recent (12 mo) or future (30 days) visit within the authorizing provider's specialty    Patient had office visit in the last 12 months or has a visit in the next 30 days with authorizing provider or within the authorizing provider's specialty.  See \"Patient Info\" tab in inbasket, or \"Choose Columns\" in Meds & Orders section of the refill encounter.           Passed - No active pregnancy on record       Passed - No positive pregnancy test in past 12 months          "

## 2018-06-11 NOTE — TELEPHONE ENCOUNTER
"Duplicate- sent 05/25/18- info sent to pharmacy.  Ruby Salvador RN  [facility]  [phone]      Last Written Prescription Date:  05/25/18  Last Fill Quantity: 28,  # refills: 11   Last office visit: 5/25/2018 with prescribing provider:     Future Office Visit:   Next 5 appointments (look out 90 days)     Shane 15, 2018  3:40 PM CDT   MyChart Injury Follow Up with Rosalie Rai MD   [facility] ([facility])    [address] [place] [address]   [phone]                 Requested Prescriptions   Pending Prescriptions Disp Refills     LARISSIA 0.1-20 MG-MCG per tablet [Pharmacy Med Name: LARISSIA-28 TABLET] 28 tablet 8     Sig: TAKE 1 TABLET BY MOUTH DAILY    Contraceptives Protocol Passed    6/9/2018 12:13 PM       Passed - Patient is not a current smoker if age is 35 or older       Passed - Recent (12 mo) or future (30 days) visit within the authorizing provider's specialty    Patient had office visit in the last 12 months or has a visit in the next 30 days with authorizing provider or within the authorizing provider's specialty.  See \"Patient Info\" tab in inbasket, or \"Choose Columns\" in Meds & Orders section of the refill encounter.           Passed - No active pregnancy on record       Passed - No positive pregnancy test in past 12 months          "

## 2018-06-11 NOTE — TELEPHONE ENCOUNTER
Patient notified with information noted below from provider and agrees with plan.  Zuleyka Marinelli RN - Triage  [facility]

## 2018-06-12 RX ORDER — LEVONORGESTREL AND ETHINYL ESTRADIOL 0.1-0.02MG
KIT ORAL
Qty: 28 TABLET | Refills: 8 | OUTPATIENT
Start: 2018-06-12

## 2018-06-12 NOTE — TELEPHONE ENCOUNTER
Same pharmacy pended.  Refused refills remaining.  Zuleyka Marinelli RN - Triage  [facility]

## 2018-06-15 ENCOUNTER — OFFICE VISIT (OUTPATIENT)
Dept: FAMILY MEDICINE | Facility: CLINIC | Age: 28
End: 2018-06-15
Payer: COMMERCIAL

## 2018-06-15 VITALS
BODY MASS INDEX: 44.32 KG/M2 | OXYGEN SATURATION: 98 % | SYSTOLIC BLOOD PRESSURE: 129 MMHG | TEMPERATURE: 97.3 F | HEIGHT: 64 IN | DIASTOLIC BLOOD PRESSURE: 91 MMHG | HEART RATE: 53 BPM | WEIGHT: 259.6 LBS | RESPIRATION RATE: 14 BRPM

## 2018-06-15 DIAGNOSIS — Z48.02 ENCOUNTER FOR REMOVAL OF SUTURES: Primary | ICD-10-CM

## 2018-06-15 PROCEDURE — 99213 OFFICE O/P EST LOW 20 MIN: CPT | Performed by: INTERNAL MEDICINE

## 2018-06-15 NOTE — PROGRESS NOTES
"  SUBJECTIVE:   Germania Alcala is a 27 year old female who presents to clinic today for the following health issues:    Concern - Suture Removal  Onset: Last Monday (11 days ago)     Description:   Pt was walking with a friend holding a glass water bottle, pt tripped over a pothole and fell with glass water bottle shattering on her rt palm of hand. Pt was seen at [facility].     Sutures placed, hand is healing up well.  Pain is much improved.         Reviewed and updated as needed this visit by clinical staff  Tobacco  Allergies  Meds       Reviewed and updated as needed this visit by Provider         ROS:  Skin reviewed,  otherwise negative unless noted above.       OBJECTIVE:     BP (!) 129/91  Pulse 53  Temp 97.3  F (36.3  C) (Tympanic)  Resp 14  Ht 5' 4\" (1.626 m)  Wt 259 lb 9.6 oz (117.8 kg)  SpO2 98%  BMI 44.56 kg/m2  Body mass index is 44.56 kg/(m^2).    Gen: pleasant, well appearing young woman, no distress  Skin: palm of right hand with two well-healing incisions, one mid palm (8 sutures) and one more proximal (3 sutures)         ASSESSMENT/PLAN:         1. Encounter for removal of sutures  Sutures were removed.  Still some healing to go on the mid palm incision.  Recommended abx ointment and covering would until healed.    - REMOVAL OF SUTURES    F/U as needed for persistent or worsening symptoms.     Recommended to monitor blood pressure, it was elevated today     Rosalie Rai MD  [facility]  "

## 2018-06-15 NOTE — MR AVS SNAPSHOT
"              After Visit Summary   6/15/2018    Germania Alcala    MRN: [id number]           Patient Information     Date Of Birth          1990        Visit Information        Provider Department      6/15/2018 3:40 PM Rosalie Rai MD [facility] [place]        Today's Diagnoses     Encounter for removal of sutures    -  1       Follow-ups after your visit        Follow-up notes from your care team     Return if symptoms worsen or fail to improve.      Who to contact     If you have questions or need follow up information about today's clinic visit or your schedule please contact [facility] [place] directly at [phone].  Normal or non-critical lab and imaging results will be communicated to you by MyChart, letter or phone within 4 business days after the clinic has received the results. If you do not hear from us within 7 days, please contact the clinic through [employer] or phone. If you have a critical or abnormal lab result, we will notify you by phone as soon as possible.  Submit refill requests through [employer] or call your pharmacy and they will forward the refill request to us. Please allow 3 business days for your refill to be completed.          Additional Information About Your Visit        MyChart Information     [employer] gives you secure access to your electronic health record. If you see a primary care provider, you can also send messages to your care team and make appointments. If you have questions, please call your primary care clinic.  If you do not have a primary care provider, please call [phone] and they will assist you.        Care EveryWhere ID     This is your Care EveryWhere ID. This could be used by other organizations to access your [place] medical records  [id number]        Your Vitals Were     Pulse Temperature Respirations Height Pulse Oximetry BMI (Body Mass Index)    53 97.3  F (36.3  C) (Tympanic) 14 5' 4\" (1.626 m) 98% 44.56 kg/m2       Blood " Pressure from Last 3 Encounters:   06/15/18 (!) 129/91   05/25/18 120/76   02/13/18 136/70    Weight from Last 3 Encounters:   06/15/18 259 lb 9.6 oz (117.8 kg)   05/25/18 259 lb 3.2 oz (117.6 kg)   02/13/18 244 lb (110.7 kg)              We Performed the Following     REMOVAL OF SUTURES        Primary Care Provider Office Phone # Fax #    Jt Ayala PA-C [phone]       [address]  [place] [address]        Equal Access to Services     [facility]: Hadii aad ku hadasho Soomaali, waaxda luqadaha, qaybta kaalmada adeegyada, jasper muhammad . So [facility] [phone].    ATENCIÓN: Si habla español, tiene a solitario disposición servicios gratuitos de asistencia lingüística. [facility] [phone].    We comply with applicable federal civil rights laws and [place] laws. We do not discriminate on the basis of race, color, national origin, age, disability, sex, sexual orientation, or gender identity.            Thank you!     Thank you for choosing [facility] [place]  for your care. Our goal is always to provide you with excellent care. Hearing back from our patients is one way we can continue to improve our services. Please take a few minutes to complete the written survey that you may receive in the mail after your visit with us. Thank you!             Your Updated Medication List - Protect others around you: Learn how to safely use, store and throw away your medicines at www.disposemymeds.org.          This list is accurate as of 6/15/18 11:59 PM.  Always use your most recent med list.                   Brand Name Dispense Instructions for use Diagnosis    albuterol 108 (90 Base) MCG/ACT Inhaler    PROAIR HFA/PROVENTIL HFA/VENTOLIN HFA    1 Inhaler    Inhale 2 puffs into the lungs every 4 hours as needed for shortness of breath / dyspnea    Mild intermittent asthma without complication       cetirizine 10 MG tablet    zyrTEC     Take 10 mg by mouth  daily.        levonorgestrel-ethinyl estradiol 0.1-20 MG-MCG per tablet    ROBERT PEREZ LESSINA 28 tablet    Take 1 tablet po daily    Encounter for surveillance of contraceptive pills

## 2018-10-22 ENCOUNTER — OFFICE VISIT (OUTPATIENT)
Dept: OPTOMETRY | Facility: CLINIC | Age: 28
End: 2018-10-22
Payer: COMMERCIAL

## 2018-10-22 DIAGNOSIS — Z53.9 ERRONEOUS ENCOUNTER--DISREGARD: Primary | ICD-10-CM

## 2018-10-22 DIAGNOSIS — H18.829 KERATITIS SECONDARY TO CONTACT LENS: Primary | ICD-10-CM

## 2018-10-22 DIAGNOSIS — H16.8 KERATITIS SECONDARY TO CONTACT LENS: Primary | ICD-10-CM

## 2018-10-22 PROCEDURE — 92002 INTRM OPH EXAM NEW PATIENT: CPT | Performed by: OPTOMETRIST

## 2018-10-22 RX ORDER — ASPIRIN 325 MG
325 TABLET ORAL
COMMUNITY
Start: 2013-01-15 | End: 2021-01-27

## 2018-10-22 ASSESSMENT — VISUAL ACUITY
OD_CC: 20/20
METHOD: SNELLEN - LINEAR
OS_CC: 20/50
CORRECTION_TYPE: GLASSES
OS_CC+: -1

## 2018-10-22 ASSESSMENT — EXTERNAL EXAM - LEFT EYE: OS_EXAM: NORMAL

## 2018-10-22 ASSESSMENT — SLIT LAMP EXAM - LIDS
COMMENTS: NORMAL
COMMENTS: NORMAL

## 2018-10-22 ASSESSMENT — EXTERNAL EXAM - RIGHT EYE: OD_EXAM: NORMAL

## 2018-10-22 NOTE — PATIENT INSTRUCTIONS
L Cornea is irritated  Instilled erythromycin ophthalmic ointment today in lower lid and dilated the L eye to help with comfort   You may use ointment again today and at bedtime   Then artificial tears as needed   I recommend using artificial tears for your dry eye. There are over the counter drops that work well and may be used up to 4 x daily. ( systane , refresh, thera tears) If you need more than 4 drops daily, use a preservative free product which come in individual vials and may be used for 24 hours until finished and discarded.     Should be resolved within a day

## 2018-10-22 NOTE — MR AVS SNAPSHOT
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 There are over the counter drops that work well and may be used up to 4 x daily. ( systane , refresh, thera tears) If you need more than 4 drops daily, use a preservative free product which come in individual vials and may be used for 24 hours until finished and discarded.     Should be resolved within a day              Follow-ups after your visit        Follow-up notes from your care team     Return if symptoms worsen or fail to improve.      Who to contact     If you have questions or need follow up information about today's clinic visit or your schedule please contact [facility] directly at [phone].  Normal or non-critical lab and imaging results will be communicated to you by MyChart, letter or phone within 4 business days after the clinic has received the results. If you do not hear from us within 7 days, please contact the clinic through [employer] or phone. If you have a critical or abnormal lab result, we will notify you by phone as soon as possible.  Submit refill requests through [employer] or call your pharmacy and they will forward the refill request to us. Please allow 3 business days for your refill to be completed.          Additional Information About Your Visit        MyChart Information     [employer] gives you secure access to your electronic health record.  If you see a primary care provider, you can also send messages to your care team and make appointments. If you have questions, please call your primary care clinic.  If you do not have a primary care provider, please call [phone] and they will assist you.        Care EveryWhere ID     This is your Care EveryWhere ID. This could be used by other organizations to access your [place] medical records  [id number]         Blood Pressure from Last 3 Encounters:   06/15/18 (!) 129/91   05/25/18 120/76   02/13/18 136/70    Weight from Last 3 Encounters:   06/15/18 117.8 kg (259 lb 9.6 oz)   05/25/18 117.6 kg (259 lb 3.2 oz)   02/13/18 110.7 kg (244 lb)              Today, you had the following     No orders found for display       Primary Care Provider Office Phone # Fax #    Jt Ayala PA-C [phone]       [address]  [place] [address]        Equal Access to Services     [facility]: Hadii aad ku hadasho Soomaali, waaxda luqadaha, qaybta kaalmada adeegyada, waxay idiin haycj muhammad . So [facility] [phone].    ATENCIÓN: Si habla español, tiene a solitario disposición servicios gratuitos de asistencia lingüística. Llame al [phone].    We comply with applicable federal civil rights laws and [place] laws. We do not discriminate on the basis of race, color, national origin, age, disability, sex, sexual orientation, or gender identity.            Thank you!     Thank you for choosing [facility] [place]  for your care. Our goal is always to provide you with excellent care. Hearing back from our patients is one way we can continue to improve our services. Please take a few minutes to complete the written survey that you may receive in the mail after your visit with us. Thank you!             Your Updated Medication List - Protect others around you: Learn how to safely use, store and throw away your medicines at www.disposemymeds.org.          This list is  accurate as of 10/22/18 10:06 AM.  Always use your most recent med list.                   Brand Name Dispense Instructions for use Diagnosis    albuterol 108 (90 Base) MCG/ACT inhaler    PROAIR HFA/PROVENTIL HFA/VENTOLIN HFA    1 Inhaler    Inhale 2 puffs into the lungs every 4 hours as needed for shortness of breath / dyspnea    Mild intermittent asthma without complication       aspirin 325 MG tablet      Take 325 mg by mouth        cetirizine 10 MG tablet    zyrTEC     Take 10 mg by mouth daily.        levonorgestrel-ethinyl estradiol 0.1-20 MG-MCG per tablet    ROBERT PEREZ LESSINA    28 tablet    Take 1 tablet po daily    Encounter for surveillance of contraceptive pills

## 2018-10-22 NOTE — LETTER
10/22/2018         RE: Germania Alcala  [address]   [place] [address]        Dear Colleague,    [facility] Please see a copy of my visit note below.    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 Patient does not sleep in her contacts. Patient od eye contact was bothering her yesterday so she took it out but os contact was ok.             Medical, surgical and family histories reviewed and updated 10/22/2018.       OBJECTIVE: See Ophthalmology exam    ASSESSMENT:    ICD-10-CM    1. 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        Sincerely,        Ivory Huerta, OD

## 2018-10-22 NOTE — PROGRESS NOTES
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 Patient does not sleep in her contacts. Patient od eye contact was bothering her yesterday so she took it out but os contact was ok.             Medical, surgical and family histories reviewed and updated 10/22/2018.       OBJECTIVE: See Ophthalmology exam    ASSESSMENT:    ICD-10-CM    1. 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

## 2019-06-23 DIAGNOSIS — Z30.41 ENCOUNTER FOR SURVEILLANCE OF CONTRACEPTIVE PILLS: ICD-10-CM

## 2019-06-24 NOTE — TELEPHONE ENCOUNTER
"Requested Prescriptions   Pending Prescriptions Disp Refills     LARISSIA 0.1-20 MG-MCG tablet [Pharmacy Med Name: LARISSIA-28 TABLET] 28 tablet 0     Sig: TAKE 1 TABLET BY MOUTH EVERY DAY  Last Written Prescription Date:  5/28/19  Last Fill Quantity: 28,  # refills: 0   Last office visit: 6/15/2018 with prescribing provider:  Cecelia   Future Office Visit:           Contraceptives Protocol Failed - 6/23/2019  1:35 PM        Failed - Recent (12 mo) or future (30 days) visit within the authorizing provider's specialty     Patient had office visit in the last 12 months or has a visit in the next 30 days with authorizing provider or within the authorizing provider's specialty.  See \"Patient Info\" tab in inbasket, or \"Choose Columns\" in Meds & Orders section of the refill encounter.              Passed - Patient is not a current smoker if age is 35 or older        Passed - Medication is active on med list        Passed - No active pregnancy on record        Passed - No positive pregnancy test in past 12 months          " normal... Well appearing, well nourished, awake, alert, oriented to person, place, time/situation and in no apparent distress.

## 2019-06-25 RX ORDER — LEVONORGESTREL AND ETHINYL ESTRADIOL 0.1-0.02MG
KIT ORAL
Qty: 28 TABLET | Refills: 0 | Status: SHIPPED | OUTPATIENT
Start: 2019-06-25 | End: 2019-07-19

## 2019-07-19 ENCOUNTER — OFFICE VISIT (OUTPATIENT)
Dept: FAMILY MEDICINE | Facility: CLINIC | Age: 29
End: 2019-07-19
Payer: COMMERCIAL

## 2019-07-19 VITALS
RESPIRATION RATE: 14 BRPM | HEART RATE: 92 BPM | WEIGHT: 274 LBS | BODY MASS INDEX: 45.65 KG/M2 | OXYGEN SATURATION: 97 % | HEIGHT: 65 IN | SYSTOLIC BLOOD PRESSURE: 110 MMHG | TEMPERATURE: 98.2 F | DIASTOLIC BLOOD PRESSURE: 86 MMHG

## 2019-07-19 DIAGNOSIS — Z30.41 ENCOUNTER FOR SURVEILLANCE OF CONTRACEPTIVE PILLS: ICD-10-CM

## 2019-07-19 DIAGNOSIS — E66.01 MORBID OBESITY (H): ICD-10-CM

## 2019-07-19 DIAGNOSIS — J45.20 MILD INTERMITTENT ASTHMA WITHOUT COMPLICATION: ICD-10-CM

## 2019-07-19 DIAGNOSIS — Z11.3 SCREEN FOR STD (SEXUALLY TRANSMITTED DISEASE): Primary | ICD-10-CM

## 2019-07-19 PROCEDURE — 87491 CHLMYD TRACH DNA AMP PROBE: CPT | Performed by: PHYSICIAN ASSISTANT

## 2019-07-19 PROCEDURE — 87591 N.GONORRHOEAE DNA AMP PROB: CPT | Performed by: PHYSICIAN ASSISTANT

## 2019-07-19 PROCEDURE — 99213 OFFICE O/P EST LOW 20 MIN: CPT | Performed by: PHYSICIAN ASSISTANT

## 2019-07-19 RX ORDER — ALBUTEROL SULFATE 90 UG/1
2 AEROSOL, METERED RESPIRATORY (INHALATION) EVERY 4 HOURS PRN
Qty: 1 INHALER | Refills: 3 | Status: SHIPPED | OUTPATIENT
Start: 2019-07-19 | End: 2021-01-27

## 2019-07-19 RX ORDER — LEVONORGESTREL/ETHIN.ESTRADIOL 0.1-0.02MG
1 TABLET ORAL DAILY
Qty: 84 TABLET | Refills: 3 | Status: SHIPPED | OUTPATIENT
Start: 2019-07-19 | End: 2020-06-29

## 2019-07-19 ASSESSMENT — MIFFLIN-ST. JEOR: SCORE: 1975.8

## 2019-07-19 NOTE — PROGRESS NOTES
Subjective     Germania Alcala is a 28 year old female who presents to clinic today for the following health issues:    HPI   Medication Followup of Birth Control    Taking Medication as prescribed: yes    Side Effects:  None    Medication Helping Symptoms:  Very light periods, usually has period 1x every 2-3 months since taking OCP         Germania presents to the clinic for refills of her birth control medication.  This medication causes her to have less frequent periods, usually every 2-3 months. Prior to taking OCP her periods were very light, and lasted only 3 days. No other SE.  She is otherwise happy with this choice of birth control.        Patient Active Problem List   Diagnosis     Intermittent asthma     Fracture of lateral malleolus of left ankle     Allergy to mold spores     Allergic rhinitis due to animal dander     Diagnostic skin and sensitization tests(aka ALLERGENS)     Class 3 obesity without serious comorbidity with body mass index (BMI) of 40.0 to 44.9 in adult     Ankle fracture, left     Morbid obesity (H)     Past Surgical History:   Procedure Laterality Date     NO HISTORY OF SURGERY         Social History     Tobacco Use     Smoking status: Never Smoker     Smokeless tobacco: Never Used   Substance Use Topics     Alcohol use: No     Alcohol/week: 0.0 oz     Family History   Problem Relation Age of Onset     Hypertension Father      Asthma Maternal Grandmother      Cancer - colorectal Maternal Grandfather      C.A.D. No family hx of      Diabetes No family hx of      Cerebrovascular Disease No family hx of      Breast Cancer No family hx of      Prostate Cancer No family hx of          Current Outpatient Medications   Medication Sig Dispense Refill     albuterol (PROAIR HFA/PROVENTIL HFA/VENTOLIN HFA) 108 (90 Base) MCG/ACT inhaler Inhale 2 puffs into the lungs every 4 hours as needed for shortness of breath / dyspnea 1 Inhaler 3     aspirin 325 MG tablet Take 325 mg by mouth       cetirizine  "(ZYRTEC) 10 MG tablet Take 10 mg by mouth daily.       levonorgestrel-ethinyl estradiol (LARISSIA) 0.1-20 MG-MCG tablet Take 1 tablet by mouth daily 84 tablet 3     Allergies   Allergen Reactions     Cats      Mold      Nkda [No Known Drug Allergies]          Reviewed and updated as needed this visit by Provider  Tobacco  Med Hx  Surg Hx  Fam Hx  Soc Hx        Review of Systems   ROS COMP: Constitutional, HEENT, cardiovascular, pulmonary, gi and gu systems are negative, except as otherwise noted.      Objective    /86   Pulse 92   Temp 98.2  F (36.8  C) (Tympanic)   Resp 14   Ht 1.638 m (5' 4.5\")   Wt 124.3 kg (274 lb)   LMP 06/06/2019   SpO2 97%   BMI 46.31 kg/m    Body mass index is 46.31 kg/m .  Physical Exam   GENERAL: healthy, alert and no distress  RESP: lungs clear to auscultation - no rales, rhonchi or wheezes  CV: regular rate and rhythm, normal S1 S2, no S3 or S4, no murmur, click or rub  PSYCH: mentation appears normal, affect normal/bright    Diagnostic Test Results:  No results found for this or any previous visit (from the past 24 hour(s)).        Assessment & Plan     1. Encounter for surveillance of contraceptive pills  Discussed side effects of her OCP, infrequent periods are likely a SE of this pill, I suspect her cycle will return to baseline if she discontinues this.  For now, she is happy with the pill and wishes to continue.  Refilled x 1 year  - levonorgestrel-ethinyl estradiol (LARISSIA) 0.1-20 MG-MCG tablet; Take 1 tablet by mouth daily  Dispense: 84 tablet; Refill: 3    2. Mild intermittent asthma without complication  controlled  - albuterol (PROAIR HFA/PROVENTIL HFA/VENTOLIN HFA) 108 (90 Base) MCG/ACT inhaler; Inhale 2 puffs into the lungs every 4 hours as needed for shortness of breath / dyspnea  Dispense: 1 Inhaler; Refill: 3    3. Morbid obesity (H)    4. Screen for STD (sexually transmitted disease)  - NEISSERIA GONORRHOEA PCR  - CHLAMYDIA TRACHOMATIS PCR     BMI: " "  Estimated body mass index is 46.31 kg/m  as calculated from the following:    Height as of this encounter: 1.638 m (5' 4.5\").    Weight as of this encounter: 124.3 kg (274 lb).         Return in about 1 year (around 7/19/2020) for Physical Exam.    Jt Ayala PA-C  [facility]      "

## 2019-07-20 ASSESSMENT — ASTHMA QUESTIONNAIRES: ACT_TOTALSCORE: 24

## 2019-07-22 NOTE — RESULT ENCOUNTER NOTE
Germania-  Here are your recent results.       -Chlamydia and gonnohrea tests are normal.    If you have any questions please do not hesitate to contact our office via phone ([phone]) or you may send me a message via [employer] by clicking the contact my Care Team link.      It was a pleasure participating in your care!    Thank you,    Jt Ayala MPH, PA-C  [address] [place] [address] [phone]

## 2020-07-14 DIAGNOSIS — Z30.41 ENCOUNTER FOR SURVEILLANCE OF CONTRACEPTIVE PILLS: ICD-10-CM

## 2020-07-14 RX ORDER — LEVONORGESTREL/ETHIN.ESTRADIOL 0.1-0.02MG
1 TABLET ORAL DAILY
Qty: 28 TABLET | Refills: 0 | OUTPATIENT
Start: 2020-07-14

## 2020-12-13 ENCOUNTER — HEALTH MAINTENANCE LETTER (OUTPATIENT)
Age: 30
End: 2020-12-13

## 2021-01-15 NOTE — PROGRESS NOTES
"   SUBJECTIVE:   CC: Germania Alcala is an 30 year old woman who presents for preventive health visit.     {Split Bill scripting  The purpose of this visit is to discuss your medical history and prevent health problems before you are sick. You may be responsible for a co-pay, coinsurance, or deductible if your visit today includes services such as checking on a sore throat, having an x-ray or lab test, or treating and evaluating a new or existing condition :856559}  Patient has been advised of split billing requirements and indicates understanding: {Yes and No:435948}  Healthy Habits:    Do you get at least three servings of calcium containing foods daily (dairy, green leafy vegetables, etc.)? { :655173::\"yes\"}    Amount of exercise or daily activities, outside of work: { :356983}    Problems taking medications regularly { :416435::\"No\"}    Medication side effects: { :790955::\"No\"}    Have you had an eye exam in the past two years? { :751690}    Do you see a dentist twice per year? { :714415}    Do you have sleep apnea, excessive snoring or daytime drowsiness?{ :449560}  {Outside tests to abstract? :809479}    {additional problems to add (Optional):291790}    Today's PHQ-2 Score:   PHQ-2 ( 1999 [employer]) 7/19/2019 6/15/2018   Q1: Little interest or pleasure in doing things 0 0   Q2: Feeling down, depressed or hopeless 0 0   PHQ-2 Score 0 0     {PHQ-2 LOOK IN ASSESSMENTS (Optional) :884099}  Abuse: Current or Past(Physical, Sexual or Emotional)- {YES/NO/NA:484862}  Do you feel safe in your environment? {YES/NO/NA:978576}        Social History     Tobacco Use     Smoking status: Never Smoker     Smokeless tobacco: Never Used   Substance Use Topics     Alcohol use: No     Alcohol/week: 0.0 standard drinks     If you drink alcohol do you typically have >3 drinks per day or >7 drinks per week? {ETOH :158352}                     Reviewed orders with patient.  Reviewed health maintenance and updated orders accordingly - " "{Yes/No:647872::\"Yes\"}  {Chronicprobdata (Optional):408073}    {Mammo Decision Support (Optional):011257}    Pertinent mammograms are reviewed under the imaging tab.  History of abnormal Pap smear: {PAP HX:156662}  PAP / HPV 6/23/2017 11/15/2013   PAP NIL NIL     Reviewed and updated as needed this visit by clinical staff                 Reviewed and updated as needed this visit by Provider                {HISTORY OPTIONS (Optional):557271}    ROS:  { :654427}    OBJECTIVE:   There were no vitals taken for this visit.  EXAM:  {Exam Choices:737789}    {Diagnostic Test Results (Optional):060921::\"Diagnostic Test Results:\",\"Labs reviewed in Epic\"}    ASSESSMENT/PLAN:   {Diag Picklist:401285}    Patient has been advised of split billing requirements and indicates understanding: {YES / NO:537808::\"Yes\"}  COUNSELING:   {FEMALE COUNSELING MESSAGES:833240::\"Reviewed preventive health counseling, as reflected in patient instructions\"}    Estimated body mass index is 46.31 kg/m  as calculated from the following:    Height as of 7/19/19: 1.638 m (5' 4.5\").    Weight as of 7/19/19: 124.3 kg (274 lb).    {Weight Management Plan (ACO) Complete if BMI is abnormal-  Ages 18-64  BMI >24.9.  Age 65+ with BMI <23 or >30 (Optional):986297}    She reports that she has never smoked. She has never used smokeless tobacco.      Counseling Resources:  ATP IV Guidelines  Pooled Cohorts Equation Calculator  Breast Cancer Risk Calculator  BRCA-Related Cancer Risk Assessment: FHS-7 Tool  FRAX Risk Assessment  ICSI Preventive Guidelines  Dietary Guidelines for Americans, 2010  USDA's MyPlate  ASA Prophylaxis  Lung CA Screening    [facility]  "

## 2021-01-27 ENCOUNTER — OFFICE VISIT (OUTPATIENT)
Dept: FAMILY MEDICINE | Facility: CLINIC | Age: 31
End: 2021-01-27
Payer: COMMERCIAL

## 2021-01-27 VITALS
DIASTOLIC BLOOD PRESSURE: 80 MMHG | TEMPERATURE: 99.5 F | HEART RATE: 81 BPM | WEIGHT: 225 LBS | SYSTOLIC BLOOD PRESSURE: 132 MMHG | OXYGEN SATURATION: 100 % | BODY MASS INDEX: 38.41 KG/M2 | HEIGHT: 64 IN

## 2021-01-27 DIAGNOSIS — Z11.3 SCREEN FOR STD (SEXUALLY TRANSMITTED DISEASE): ICD-10-CM

## 2021-01-27 DIAGNOSIS — Z12.4 SCREENING FOR MALIGNANT NEOPLASM OF CERVIX: ICD-10-CM

## 2021-01-27 DIAGNOSIS — J45.20 MILD INTERMITTENT ASTHMA WITHOUT COMPLICATION: ICD-10-CM

## 2021-01-27 DIAGNOSIS — Z30.41 ENCOUNTER FOR SURVEILLANCE OF CONTRACEPTIVE PILLS: ICD-10-CM

## 2021-01-27 DIAGNOSIS — Z00.00 ROUTINE GENERAL MEDICAL EXAMINATION AT A HEALTH CARE FACILITY: Primary | ICD-10-CM

## 2021-01-27 PROCEDURE — 87491 CHLMYD TRACH DNA AMP PROBE: CPT | Performed by: NURSE PRACTITIONER

## 2021-01-27 PROCEDURE — 36415 COLL VENOUS BLD VENIPUNCTURE: CPT | Performed by: NURSE PRACTITIONER

## 2021-01-27 PROCEDURE — 90471 IMMUNIZATION ADMIN: CPT | Performed by: NURSE PRACTITIONER

## 2021-01-27 PROCEDURE — 82947 ASSAY GLUCOSE BLOOD QUANT: CPT | Performed by: NURSE PRACTITIONER

## 2021-01-27 PROCEDURE — 90732 PPSV23 VACC 2 YRS+ SUBQ/IM: CPT | Performed by: NURSE PRACTITIONER

## 2021-01-27 PROCEDURE — 80061 LIPID PANEL: CPT | Performed by: NURSE PRACTITIONER

## 2021-01-27 PROCEDURE — 87591 N.GONORRHOEAE DNA AMP PROB: CPT | Performed by: NURSE PRACTITIONER

## 2021-01-27 PROCEDURE — G0145 SCR C/V CYTO,THINLAYER,RESCR: HCPCS | Performed by: NURSE PRACTITIONER

## 2021-01-27 PROCEDURE — G0124 SCREEN C/V THIN LAYER BY MD: HCPCS | Performed by: PATHOLOGY

## 2021-01-27 PROCEDURE — 99395 PREV VISIT EST AGE 18-39: CPT | Mod: 25 | Performed by: NURSE PRACTITIONER

## 2021-01-27 RX ORDER — LEVONORGESTREL/ETHIN.ESTRADIOL 0.1-0.02MG
1 TABLET ORAL DAILY
Qty: 84 TABLET | Refills: 4 | Status: SHIPPED | OUTPATIENT
Start: 2021-01-27 | End: 2022-03-16

## 2021-01-27 RX ORDER — ALBUTEROL SULFATE 90 UG/1
2 AEROSOL, METERED RESPIRATORY (INHALATION) EVERY 4 HOURS PRN
Qty: 1 INHALER | Refills: 3 | Status: SHIPPED | OUTPATIENT
Start: 2021-01-27

## 2021-01-27 ASSESSMENT — ENCOUNTER SYMPTOMS
SHORTNESS OF BREATH: 0
NERVOUS/ANXIOUS: 0
ABDOMINAL PAIN: 0
CONSTIPATION: 0
JOINT SWELLING: 0
HEADACHES: 0
DIARRHEA: 0
BREAST MASS: 0
PARESTHESIAS: 0
DIZZINESS: 0
HEARTBURN: 0
FEVER: 0
FREQUENCY: 0
WEAKNESS: 0
PALPITATIONS: 0
HEMATURIA: 0
SORE THROAT: 0
EYE PAIN: 0
CHILLS: 0
MYALGIAS: 0
COUGH: 0
HEMATOCHEZIA: 0
DYSURIA: 0
NAUSEA: 0
ARTHRALGIAS: 0

## 2021-01-27 ASSESSMENT — MIFFLIN-ST. JEOR: SCORE: 1735.59

## 2021-01-27 NOTE — PROGRESS NOTES
SUBJECTIVE:   CC: Germania Alcala is an 30 year old woman who presents for preventive health visit.       Patient has been advised of split billing requirements and indicates understanding: Yes  Healthy Habits:     Getting at least 3 servings of Calcium per day:  Yes    Bi-annual eye exam:  Yes    Dental care twice a year:  Yes    Sleep apnea or symptoms of sleep apnea:  None    Diet:  Regular (no restrictions)    Frequency of exercise:  2-3 days/week    Duration of exercise:  30-45 minutes    Taking medications regularly:  Yes    Medication side effects:  None    PHQ-2 Total Score: 0    Additional concerns today:  No        Today's PHQ-2 Score:   PHQ-2 ( 1999 Pfizer) 1/27/2021   Q1: Little interest or pleasure in doing things 0   Q2: Feeling down, depressed or hopeless 0   PHQ-2 Score 0   Q1: Little interest or pleasure in doing things Not at all   Q2: Feeling down, depressed or hopeless Not at all   PHQ-2 Score 0       Abuse: Current or Past (Physical, Sexual or Emotional) - No  Do you feel safe in your environment? Yes        Social History     Tobacco Use     Smoking status: Never Smoker     Smokeless tobacco: Never Used   Substance Use Topics     Alcohol use: No     Alcohol/week: 0.0 standard drinks     If you drink alcohol do you typically have >3 drinks per day or >7 drinks per week? No    Alcohol Use 1/27/2021   Prescreen: >3 drinks/day or >7 drinks/week? No   Prescreen: >3 drinks/day or >7 drinks/week? -       Any new diagnosis of family breast, ovarian, or bowel cancer? No     Reviewed orders with patient.  Reviewed health maintenance and updated orders accordingly - Yes  Labs reviewed in AlertMe    Breast CA Risk Screening:  No flowsheet data found.  No flowsheet data found.      Pertinent mammograms are reviewed under the imaging tab.    History of abnormal Pap smear: NO - age 21-29 PAP every 3 years recommended  PAP / HPV 6/23/2017 11/15/2013   PAP NIL NIL     Reviewed and updated as needed this visit by  "clinical staff  Tobacco  Allergies  Meds              Reviewed and updated as needed this visit by Provider                    Review of Systems   Constitutional: Negative for chills and fever.   HENT: Negative for congestion, ear pain, hearing loss and sore throat.    Eyes: Negative for pain and visual disturbance.   Respiratory: Negative for cough and shortness of breath.    Cardiovascular: Negative for chest pain, palpitations and peripheral edema.   Gastrointestinal: Negative for abdominal pain, constipation, diarrhea, heartburn, hematochezia and nausea.   Breasts:  Negative for tenderness, breast mass and discharge.   Genitourinary: Negative for dysuria, frequency, genital sores, hematuria, pelvic pain, urgency, vaginal bleeding and vaginal discharge.   Musculoskeletal: Negative for arthralgias, joint swelling and myalgias.   Skin: Negative for rash.   Neurological: Negative for dizziness, weakness, headaches and paresthesias.   Psychiatric/Behavioral: Negative for mood changes. The patient is not nervous/anxious.           OBJECTIVE:   /80 (BP Location: Left arm, Patient Position: Chair, Cuff Size: Adult Large)   Pulse 81   Temp 99.5  F (37.5  C) (Oral)   Ht 1.626 m (5' 4\")   Wt 102.1 kg (225 lb)   LMP 01/15/2021   SpO2 100%   BMI 38.62 kg/m    Physical Exam  GENERAL: healthy, alert and no distress  EYES: Eyes grossly normal to inspection, PERRL and conjunctivae and sclerae normal  HENT: ear canals and TM's normal, nose and mouth without ulcers or lesions  NECK: no adenopathy, no asymmetry, masses, or scars and thyroid normal to palpation  RESP: lungs clear to auscultation - no rales, rhonchi or wheezes  BREAST: normal without masses, tenderness or nipple discharge and no palpable axillary masses or adenopathy  CV: regular rate and rhythm, normal S1 S2, no S3 or S4, no murmur, click or rub, no peripheral edema and peripheral pulses strong  ABDOMEN: soft, nontender, no hepatosplenomegaly, no " masses and bowel sounds normal   (female): normal female external genitalia, normal urethral meatus, vaginal mucosa pink, moist, well rugated, and normal cervix/adnexa/uterus without masses or discharge  MS: no gross musculoskeletal defects noted, no edema  SKIN: no suspicious lesions or rashes  NEURO: Normal strength and tone, mentation intact and speech normal  PSYCH: mentation appears normal, affect normal/bright    Diagnostic Test Results:  Labs reviewed in Epic  No results found for this or any previous visit (from the past 24 hour(s)).    ASSESSMENT/PLAN:   1. Routine general medical examination at a health care facility    - Lipid panel reflex to direct LDL Fasting  - Glucose    2. Mild intermittent asthma without complication  Well controlled, medications refilled  - albuterol (PROAIR HFA/PROVENTIL HFA/VENTOLIN HFA) 108 (90 Base) MCG/ACT inhaler; Inhale 2 puffs into the lungs every 4 hours as needed for shortness of breath / dyspnea  Dispense: 1 Inhaler; Refill: 3    3. Encounter for surveillance of contraceptive pills  Birth control refilled  - levonorgestrel-ethinyl estradiol (SRONYX) 0.1-20 MG-MCG tablet; Take 1 tablet by mouth daily  Dispense: 84 tablet; Refill: 4    4. BMI 38.0-38.9,adult  Has recently lost weight- applauded her success and encouraged continued diet/exercise habits    5. Screening for malignant neoplasm of cervix    - Pap imaged thin layer screen reflex to HPV if ASCUS - recommend age 25 - 29    6. Screen for STD (sexually transmitted disease)    - NEISSERIA GONORRHOEA PCR  - CHLAMYDIA TRACHOMATIS PCR    Patient has been advised of split billing requirements and indicates understanding: No  COUNSELING:  Reviewed preventive health counseling, as reflected in patient instructions       Regular exercise       Healthy diet/nutrition       Immunizations    Vaccinated for: Pneumococcal             Contraception    Estimated body mass index is 38.62 kg/m  as calculated from the following:     "Height as of this encounter: 1.626 m (5' 4\").    Weight as of this encounter: 102.1 kg (225 lb).    Weight management plan: Discussed healthy diet and exercise guidelines    She reports that she has never smoked. She has never used smokeless tobacco.      Counseling Resources:  ATP IV Guidelines  Pooled Cohorts Equation Calculator  Breast Cancer Risk Calculator  BRCA-Related Cancer Risk Assessment: FHS-7 Tool  FRAX Risk Assessment  ICSI Preventive Guidelines  Dietary Guidelines for Americans, 2010  USDA's MyPlate  ASA Prophylaxis  Lung CA Screening    Arabella Egan, VAHID CNP  [facility]  "

## 2021-01-28 PROBLEM — E66.01 MORBID OBESITY (H): Status: RESOLVED | Noted: 2019-07-19 | Resolved: 2021-01-28

## 2021-01-28 LAB
CHOLEST SERPL-MCNC: 160 MG/DL
GLUCOSE SERPL-MCNC: 83 MG/DL (ref 70–99)
HDLC SERPL-MCNC: 49 MG/DL
LDLC SERPL CALC-MCNC: 84 MG/DL
NONHDLC SERPL-MCNC: 111 MG/DL
TRIGL SERPL-MCNC: 133 MG/DL

## 2021-01-28 ASSESSMENT — ASTHMA QUESTIONNAIRES: ACT_TOTALSCORE: 25

## 2021-02-02 PROBLEM — R87.613 HSIL (HIGH GRADE SQUAMOUS INTRAEPITHELIAL LESION) ON PAP SMEAR OF CERVIX: Status: ACTIVE | Noted: 2021-01-27

## 2021-02-02 LAB
COPATH REPORT: ABNORMAL
PAP: ABNORMAL

## 2021-02-03 ENCOUNTER — PATIENT OUTREACH (OUTPATIENT)
Dept: FAMILY MEDICINE | Facility: CLINIC | Age: 31
End: 2021-02-03

## 2021-03-03 ENCOUNTER — OFFICE VISIT (OUTPATIENT)
Dept: OBGYN | Facility: CLINIC | Age: 31
End: 2021-03-03
Payer: COMMERCIAL

## 2021-03-03 VITALS
HEART RATE: 56 BPM | DIASTOLIC BLOOD PRESSURE: 84 MMHG | WEIGHT: 227 LBS | BODY MASS INDEX: 38.96 KG/M2 | SYSTOLIC BLOOD PRESSURE: 127 MMHG | OXYGEN SATURATION: 97 %

## 2021-03-03 DIAGNOSIS — R87.613 HSIL (HIGH GRADE SQUAMOUS INTRAEPITHELIAL LESION) ON PAP SMEAR OF CERVIX: Primary | ICD-10-CM

## 2021-03-03 PROCEDURE — 88342 IMHCHEM/IMCYTCHM 1ST ANTB: CPT | Performed by: PATHOLOGY

## 2021-03-03 PROCEDURE — 88305 TISSUE EXAM BY PATHOLOGIST: CPT | Performed by: PATHOLOGY

## 2021-03-03 PROCEDURE — 57454 BX/CURETT OF CERVIX W/SCOPE: CPT | Performed by: OBSTETRICS & GYNECOLOGY

## 2021-03-03 NOTE — PROGRESS NOTES
Patient Name: Germania LOYD Long              Date: 3/3/2021   YOB: 1990                         Age: 30 year old   Phone: [phone] (home)   ________________________________________________________________________  I have been asked to see Germania in consultation by Arabella Egan NP,  to discuss the pap smear, findings and possible further evaluation.  The patient's pap smear history is as noted:  11/15/13 NIL pap  6/23/17 NIL pap  1/27/21 HSIL pap.  I attempted to ensure that the patient was educated regarding the nature of her findings and implications to date.  We reviewed the role of HPV, incidence in the population and the natural history of the infection, and its transmission.  We also reviewed ways to minimize her future risk, the effect of HPV on the cervix and treatment options available, should they be indicated.    The pathophysiology of the cervix, including a discussion of the squamous and columnar cells, metaplasia and dysplasia have been reviewed, drawings, sketches and the pamphlets were reviewed with her.      Patient's last menstrual period was 02/19/2021 (exact date).  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 only for cat/M (all other environmental allergens NEGATIVE), NEGATIVE IgE for shellfish (crab, lobster, shrimp).     Chlamydia 2013     Diagnostic skin and sensitization tests 7/8/13 IgE tests pos. only for cat/M (all other environmental allergens NEGATIVE), NEGATIVE IgE for shellfish (crab, lobster,  shrimp).     Intermittent asthma 05/03/2011    follows with primary care.       Past Surgical History:   Procedure Laterality Date     NO HISTORY OF SURGERY          Outpatient Encounter Medications as of 3/3/2021   Medication Sig Dispense Refill     albuterol (PROAIR HFA/PROVENTIL HFA/VENTOLIN HFA) 108 (90 Base) MCG/ACT inhaler Inhale 2 puffs into the lungs every 4 hours as needed for shortness of breath / dyspnea 1 Inhaler 3     cetirizine (ZYRTEC) 10 MG tablet Take 10 mg by mouth daily.       levonorgestrel-ethinyl estradiol (SRONYX) 0.1-20 MG-MCG tablet Take 1 tablet by mouth daily 84 tablet 4     No facility-administered encounter medications on file as of 3/3/2021.         Allergies as of 03/03/2021 - Reviewed 03/03/2021   Allergen Reaction Noted     Cats  06/25/2014     Mold  06/25/2014     Nkda [no known drug allergies]  02/04/2010       Social History     Socioeconomic History     Marital status: Single     Spouse name: None     Number of children: None     Years of education: None     Highest education level: None   Occupational History     None   Social Needs     Financial resource strain: None     Food insecurity     Worry: None     Inability: None     Transportation needs     Medical: None     Non-medical: None   Tobacco Use     Smoking status: Never Smoker     Smokeless tobacco: Never Used   Substance and Sexual Activity     Alcohol use: No     Alcohol/week: 0.0 standard drinks     Drug use: No     Sexual activity: Yes     Partners: Male     Birth control/protection: Pill   Lifestyle     Physical activity     Days per week: None     Minutes per session: None     Stress: None   Relationships     Social connections     Talks on phone: None     Gets together: None     Attends Gnosticist service: None     Active member of club or organization: None     Attends meetings of clubs or organizations: None     Relationship status: None     Intimate partner violence     Fear of current or ex partner: None      Emotionally abused: None     Physically abused: None     Forced sexual activity: None   Other Topics Concern     Parent/sibling w/ CABG, MI or angioplasty before 65F 55M? Not Asked   Social History Narrative     None        Family History   Problem Relation Age of Onset     Hypertension Father      Asthma Maternal Grandmother      Cancer - colorectal Maternal Grandfather      C.A.D. No family hx of      Diabetes No family hx of      Cerebrovascular Disease No family hx of      Breast Cancer No family hx of      Prostate Cancer No family hx of          Review Of Systems  10 point ROS of systems including Constitutional, Eyes, Respiratory, Cardiovascular, Gastroenterology, Genitourinary, Integumentary, Muscularskeletal, Psychiatric were all negative except for pertinent positives noted in my HPI and in the PMH.      Exam:   /84 (BP Location: Right arm, Cuff Size: Adult Large)   Pulse 56   Wt 103 kg (227 lb)   LMP 02/19/2021 (Exact Date)   SpO2 97%   BMI 38.96 kg/m    GENERAL:  WNWD female NAD  HEENT: NC/AT, EOMI  Lungs:  Good respiratory effort   SKIN: normal skin turgor  GAIT: Normal  NECK: Symmetrical, no masses noted   VULVA: Normal Genitalia  BUS: Normal  URETHRA:  No hypermobility noted  URETHRAL MEATUS:  No masses noted  VAGINA: Normal mucosa, no discharge  CERVIX: Closed, mobile, no discharge  PERIANAL:  No masses or lesions seen  EXTREMITIES: no clubbing, cyanosis, or edema    Assessment:  HSIL pap smear    Plan:  Recommend to Proceed with Colpo  The details of the colposcopic procedure were reviewed, the risks of missed diagnoses, pain, infection, and bleeding.    Lauri Cornelius MD        Procedure:  Procedure for colposcopy and biopsy has been explained to the patient and consent obtained.    Before the procedure, it was ensured that the patient was educated regarding the nature of her findings and implications to date.  We reviewed the role of HPV and the natural history of the infection.  We  also reviewed ways to minimize her future risk, the effect of HPV on the cervix and treatment options available, should they be indicated.    The pathophysiology of the cervix, including a discussion of the squamous and columnar cells, metaplasia and dysplasia have been reviewed, drawings, sketches and the pamphlets were reviewed with her.  The details of the colposcopic procedure were reviewed, the risks of missed diagnoses, pain, infection, and bleeding.  Questions seemed to be answered before proceeding and the patient then consented to the procedure.     Speculum placed in vagina and excellent visualization of cervix achieved, cervix swabbed  with acetic acid solution.    biopsies taken (including ECC): 3  (biopsy at 7 and 12 o'clock and ECC)  Hemostasis effected with Silver Nitrate.     Findings:  Cervix: no visible lesions and no concerning findings  Vaginal inspection: no visible lesions.  Procedure Summary: Patient tolerated procedure well and colposcopy adequate.      Assessment:   HSIL pap smear     Plan:  Specimens labelled and sent to pathology.  Will base further treatment on pathology findings.  Post biopsy instructions given to patient and call to discuss Pathology results.    Lauri Cornelius MD

## 2021-03-08 LAB — COPATH REPORT: NORMAL

## 2021-03-12 ENCOUNTER — PATIENT OUTREACH (OUTPATIENT)
Dept: OBGYN | Facility: CLINIC | Age: 31
End: 2021-03-12

## 2021-03-12 ENCOUNTER — TELEPHONE (OUTPATIENT)
Dept: OBGYN | Facility: CLINIC | Age: 31
End: 2021-03-12

## 2021-03-12 DIAGNOSIS — R87.613 HSIL (HIGH GRADE SQUAMOUS INTRAEPITHELIAL LESION) ON PAP SMEAR OF CERVIX: ICD-10-CM

## 2021-03-12 NOTE — TELEPHONE ENCOUNTER
[facility] Call Center    Phone Message    May a detailed message be left on voicemail: yes     Reason for Call: The patient called to schedule LEEP. Please advise. Thank you.    Action Taken: Message routed to:  [facility] p [phone]    Travel Screening: Not Applicable

## 2021-03-12 NOTE — TELEPHONE ENCOUNTER
I called patient and got VM. Left message to call back to help schedule.   I will try 3/30/21 at 11:00 for 1 hour.  Or 4/9/21 at 11:30 for 1 hour.  CAIRRA Luis 3/12/2021

## 2021-03-12 NOTE — TELEPHONE ENCOUNTER
I scheduled patient at [facility] with Dr. Cornelius 3/30/21 at 11:00. Told to arrive 15 minutes early   Will need upt.  CIARRA Luis 3/12/2021

## 2021-03-30 ENCOUNTER — OFFICE VISIT (OUTPATIENT)
Dept: OBGYN | Facility: CLINIC | Age: 31
End: 2021-03-30
Payer: COMMERCIAL

## 2021-03-30 VITALS
OXYGEN SATURATION: 96 % | HEART RATE: 51 BPM | BODY MASS INDEX: 38.98 KG/M2 | SYSTOLIC BLOOD PRESSURE: 124 MMHG | DIASTOLIC BLOOD PRESSURE: 81 MMHG | WEIGHT: 227.1 LBS

## 2021-03-30 DIAGNOSIS — R87.613 HSIL (HIGH GRADE SQUAMOUS INTRAEPITHELIAL LESION) ON PAP SMEAR OF CERVIX: ICD-10-CM

## 2021-03-30 DIAGNOSIS — D06.9 CIN III (CERVICAL INTRAEPITHELIAL NEOPLASIA GRADE III) WITH SEVERE DYSPLASIA: Primary | ICD-10-CM

## 2021-03-30 LAB — HCG UR QL: NEGATIVE

## 2021-03-30 PROCEDURE — 88307 TISSUE EXAM BY PATHOLOGIST: CPT | Performed by: PATHOLOGY

## 2021-03-30 PROCEDURE — 57461 CONZ OF CERVIX W/SCOPE LEEP: CPT | Performed by: OBSTETRICS & GYNECOLOGY

## 2021-03-30 PROCEDURE — 88342 IMHCHEM/IMCYTCHM 1ST ANTB: CPT | Performed by: PATHOLOGY

## 2021-03-30 PROCEDURE — 88305 TISSUE EXAM BY PATHOLOGIST: CPT | Performed by: PATHOLOGY

## 2021-03-30 PROCEDURE — 81025 URINE PREGNANCY TEST: CPT | Performed by: OBSTETRICS & GYNECOLOGY

## 2021-03-30 NOTE — PROGRESS NOTES
Patient Name: Germania Alcala              Date: 3/30/2021   YOB: 1990                         Age: 30 year old   Phone: [phone] (home)   ________________________________________________________________________  Procedure:  Germania presents today to discuss the pap smear, findings and further evaluation with the LEEP.  The patient's pap smear history is as noted:  11/15/13 NIL pap   6/23/17 NIL pap   1/27/21 HSIL pap.   3/3/21 [place]: SHAY 2-3.  Inadequate ECC.    Due to the high grade dysplasia and the inadequate ECC, the LEEP is advised.     /81 (BP Location: Right arm, Cuff Size: Adult Large)   Pulse 51   Wt 103 kg (227 lb 1.6 oz)   LMP 03/11/2021 (Exact Date)   SpO2 96%   BMI 38.98 kg/m      The patient and I discussed the procedure and the indications.   We reviewed the risks, benefits, goals, and limitations, as well as the potential complications.  Alternatives were also reviewed.  The anticipated post-op course, including the success and failure rates, limitations and consequences, was reviewed.  The potential for fertility issues, 1/5 risk, was also reviewed.  She voiced her understanding and she desired to proceed with the procedure.   After appropriate preparation, the colposcopic findings were noted.  Ten cc of 1% lidocaine with epinephrine, was injected into the cervix.  Using the wire loop and a blended current, the excision was performed with the specimen as labeled removing the transformation zone.  A second inner cone biopsy was performed due to the inadequate ECC at the colpo and since no lesions seen on the colpo today (in conjunction with the LEEP).  Endocervical curettings were obtained.  Light fulguration was performed and Monsel's solution/paste was applied.  No apparent complications.  Blood loose was minimal.  Patient tolerated the procedure well.    Patient was stable at discharge.  Await results.  Results will be called to patient or sent to her.  Instructions and  information were given to the patient.    Follow up and repeat pap smears were discussed.

## 2021-04-02 LAB — COPATH REPORT: NORMAL

## 2021-04-19 ENCOUNTER — PATIENT OUTREACH (OUTPATIENT)
Dept: OBGYN | Facility: CLINIC | Age: 31
End: 2021-04-19

## 2021-04-19 DIAGNOSIS — D06.9 SEVERE DYSPLASIA OF CERVIX (CIN III): ICD-10-CM

## 2021-04-19 NOTE — TELEPHONE ENCOUNTER
3/30/21 LEEP [facility]one Bx - SHAY 2-3, ECC - neg. Cotest due in 6 months, 9/30/21.   4/19/21 Pt viewed result on MyChart.

## 2021-09-26 ENCOUNTER — HEALTH MAINTENANCE LETTER (OUTPATIENT)
Age: 31
End: 2021-09-26

## 2021-10-06 ENCOUNTER — OFFICE VISIT (OUTPATIENT)
Dept: OBGYN | Facility: CLINIC | Age: 31
End: 2021-10-06
Payer: COMMERCIAL

## 2021-10-06 VITALS
DIASTOLIC BLOOD PRESSURE: 85 MMHG | OXYGEN SATURATION: 95 % | BODY MASS INDEX: 42.05 KG/M2 | SYSTOLIC BLOOD PRESSURE: 132 MMHG | WEIGHT: 245 LBS | HEART RATE: 65 BPM

## 2021-10-06 DIAGNOSIS — D06.9 CIN III (CERVICAL INTRAEPITHELIAL NEOPLASIA GRADE III) WITH SEVERE DYSPLASIA: Primary | ICD-10-CM

## 2021-10-06 PROCEDURE — 88175 CYTOPATH C/V AUTO FLUID REDO: CPT | Performed by: OBSTETRICS & GYNECOLOGY

## 2021-10-06 PROCEDURE — 99212 OFFICE O/P EST SF 10 MIN: CPT | Performed by: OBSTETRICS & GYNECOLOGY

## 2021-10-06 PROCEDURE — 87624 HPV HI-RISK TYP POOLED RSLT: CPT | Performed by: OBSTETRICS & GYNECOLOGY

## 2021-10-06 RX ORDER — BETAMETHASONE DIPROPIONATE 0.5 MG/G
OINTMENT, AUGMENTED TOPICAL
COMMUNITY
Start: 2021-07-02 | End: 2023-10-09

## 2021-10-06 RX ORDER — HYDROCORTISONE 25 MG/G
OINTMENT TOPICAL
COMMUNITY
Start: 2021-07-02 | End: 2023-10-09

## 2021-10-06 RX ORDER — BUPROPION HYDROCHLORIDE 150 MG/1
300 TABLET ORAL
COMMUNITY
Start: 2021-08-30 | End: 2024-01-22

## 2021-10-06 NOTE — PROGRESS NOTES
Germania is a 30 year old  here for follow up pap smear, due to her history of SHAY III and LEEP cone.    Her pap smear history is as noted:    11/15/13 NIL pap  17 NIL pap  21 HSIL pap. Plan colp  3/3/21 [place]: SHAY 2-3. Plan: LEEP before 6/3/21  3/30/21 LEEP [facility]x - SHAY 2-3, ECC - neg.        PMH: Her past medical, surgical, and obstetric histories were reviewed and are documented in their appropriate chart areas.    ALL/Meds: Her medication and allergy histories were reviewed and are documented in their appropriate chart areas    SOCIAL HISTORY and FAMILY HISTORY have also been reviewed and no updates at this time.    ROS:  All systems were reviewed and pertinent information is noted in subjective/HPI.    EXAM  /85 (BP Location: Right arm, Cuff Size: Adult Large)   Pulse 65   Wt 111.1 kg (245 lb)   LMP 2021 (Exact Date)   SpO2 95%   Breastfeeding No   BMI 42.05 kg/m    General:  WNWD female, NAD  Alert  Oriented x 3  Gait:  Normal  Skin:  Normal skin turgor  HEENT:  NC/AT, EOMI  Abdomen:  Non-tender, non-distended.  Vulva: No external lesions, normal hair distribution, no adenopathy  BUS:  Normal, no masses noted  Vagina: Moist, pink, no abnormal discharge, well rugated, no lesions  Cervix: Smooth, pink, no visible lesions  Extremities:  No clubbing, no cyanosis and no edema.      ASSESSMENT:  SHAY III    PLAN:  Repeat the pap smear and HPV co-testing.   Plan to repeat the pap smear and HPV co-testing at the time of the annual exam.   Lauri Cornelius MD

## 2021-10-12 LAB
BKR LAB AP GYN ADEQUACY: NORMAL
BKR LAB AP GYN INTERPRETATION: NORMAL
BKR LAB AP HPV REFLEX: NORMAL
BKR LAB AP LMP: NORMAL
BKR LAB AP PREVIOUS ABNL DX: NORMAL
BKR LAB AP PREVIOUS ABNORMAL: NORMAL
PATH REPORT.COMMENTS IMP SPEC: NORMAL
PATH REPORT.RELEVANT HX SPEC: NORMAL

## 2021-10-13 LAB
HUMAN PAPILLOMA VIRUS 16 DNA: NEGATIVE
HUMAN PAPILLOMA VIRUS 18 DNA: NEGATIVE
HUMAN PAPILLOMA VIRUS FINAL DIAGNOSIS: NORMAL
HUMAN PAPILLOMA VIRUS OTHER HR: NEGATIVE

## 2021-10-15 ENCOUNTER — PATIENT OUTREACH (OUTPATIENT)
Dept: OBGYN | Facility: CLINIC | Age: 31
End: 2021-10-15

## 2021-10-15 DIAGNOSIS — D06.9 SEVERE DYSPLASIA OF CERVIX (CIN III): ICD-10-CM

## 2022-02-15 ENCOUNTER — PATIENT OUTREACH (OUTPATIENT)
Dept: OBGYN | Facility: CLINIC | Age: 32
End: 2022-02-15
Payer: COMMERCIAL

## 2022-02-15 DIAGNOSIS — D06.9 SEVERE DYSPLASIA OF CERVIX (CIN III): ICD-10-CM

## 2022-02-15 NOTE — LETTER
February 15, 2022      Germania Alcala  [address]   [place] [facility] [place] [address]        Dear ,    This letter is to remind you that you are due for your follow-up Pap smear and Human Papillomavirus (HPV) test.    Please call [phone] to schedule your appointment at your earliest convenience.    If you have completed the appointment outside of the [facility] system, please have the records forwarded to our office. We will update your chart for your provider to review before your next annual wellness visit.     Thank you for choosing [facility]!      Sincerely,    Your [facility] Care Team

## 2022-03-13 ENCOUNTER — HEALTH MAINTENANCE LETTER (OUTPATIENT)
Age: 32
End: 2022-03-13

## 2022-03-14 DIAGNOSIS — Z30.41 ENCOUNTER FOR SURVEILLANCE OF CONTRACEPTIVE PILLS: ICD-10-CM

## 2022-03-16 RX ORDER — LEVONORGESTREL/ETHIN.ESTRADIOL 0.1-0.02MG
1 TABLET ORAL DAILY
Qty: 84 TABLET | Refills: 0 | Status: SHIPPED | OUTPATIENT
Start: 2022-03-16 | End: 2022-04-29

## 2022-03-16 NOTE — TELEPHONE ENCOUNTER
Medication is being filled for 1 time refill only due to:  Patient needs to be seen because needs annual exam.   Ina Hendricks RN

## 2022-04-01 ENCOUNTER — E-VISIT (OUTPATIENT)
Dept: URGENT CARE | Facility: CLINIC | Age: 32
End: 2022-04-01
Payer: COMMERCIAL

## 2022-04-01 DIAGNOSIS — N39.0 ACUTE UTI (URINARY TRACT INFECTION): Primary | ICD-10-CM

## 2022-04-01 PROCEDURE — 99421 OL DIG E/M SVC 5-10 MIN: CPT | Performed by: PHYSICIAN ASSISTANT

## 2022-04-01 RX ORDER — NITROFURANTOIN 25; 75 MG/1; MG/1
100 CAPSULE ORAL 2 TIMES DAILY
Qty: 10 CAPSULE | Refills: 0 | Status: SHIPPED | OUTPATIENT
Start: 2022-04-01 | End: 2022-04-06

## 2022-04-02 NOTE — PATIENT INSTRUCTIONS
Tristan LOYD Long    After reviewing your responses, I've been able to diagnose you with a urinary tract infection, which is a common infection of the bladder with bacteria.  This is not a sexually transmitted infection, though urinating immediately after intercourse can help prevent infections.  Drinking lots of fluids is also helpful to clear your current infection and prevent the next one.      I have sent a prescription for antibiotics to your pharmacy to treat this infection.    It is important that you take all of your prescribed medication even if your symptoms are improving after a few doses.  Taking all of your medicine helps prevent the symptoms from returning.     If your symptoms worsen, you develop pain in your back or stomach, develop fevers, or are not improving in 5 days, please contact your primary care provider for an appointment or visit any of our convenient Walk-in or Urgent Care Centers to be seen, which can be found on our website here.    Thanks again for choosing us as your health care partner,    Arabella Carmona PA-C, [facility]    Urinary Tract Infections in Women  Urinary tract infections (UTIs) are most often caused by bacteria. These bacteria enter the urinary tract. The bacteria may come from inside the body. Or they may travel from the skin outside the rectum or vagina into the urethra. Female anatomy makes it easy for bacteria from the bowel to enter a woman s urinary tract, which is the most common source of UTI. This means women develop UTIs more often than men. Pain in or around the urinary tract is a common UTI symptom. But the only way to know for sure if you have a UTI for the healthcare provider to test your urine. The two tests that may be done are the urinalysis and urine culture.     Types of UTIs    Cystitis. A bladder infection (cystitis) is the most common UTI in women. You may have urgent or frequent need to pee. You may also have pain, burning when you pee, and bloody  urine.    Urethritis. This is an inflamed urethra, which is the tube that carries urine from the bladder to outside the body. You may have lower stomach or back pain. You may also have urgent or frequent need to pee.    Pyelonephritis. This is a kidney infection. If not treated, it can be serious and damage your kidneys. In severe cases, you may need to stay in the hospital. You may have a fever and lower back pain.    Medicines to treat a UTI  Most UTIs are treated with antibiotics. These kill the bacteria. The length of time you need to take them depends on the type of infection. It may be as short as 3 days. If you have repeated UTIs, you may need a low-dose antibiotic for several months. Take antibiotics exactly as directed. Don t stop taking them until all of the medicine is gone. If you stop taking the antibiotic too soon, the infection may not go away. You may also develop a resistance to the antibiotic. This can make it much harder to treat.   Lifestyle changes to treat and prevent UTIs   The lifestyle changes below will help get rid of your UTI. They may also help prevent future UTIs.     Drink plenty of fluids. This includes water, juice, or other caffeine-free drinks. Fluids help flush bacteria out of your body.    Empty your bladder. Always empty your bladder when you feel the urge to pee. And always pee before going to sleep. Urine that stays in your bladder can lead to infection. Try to pee before and after sex as well.    Practice good personal hygiene. Wipe yourself from front to back after using the toilet. This helps keep bacteria from getting into the urethra.    Use condoms during sex. These help prevent UTIs caused by sexually transmitted bacteria. Also don't use spermicides during sex. These can increase the risk for UTIs. Choose other forms of birth control instead. For women who tend to get UTIs after sex, a low-dose of a preventive antibiotic may be used. Be sure to discuss this option with  your healthcare provider.    Follow up with your healthcare provider as directed. He or she may test to make sure the infection has cleared. If needed, more treatment may be started.  [employer] last reviewed this educational content on 7/1/2019 2000-2021 [employer]. All rights reserved. This information is not intended as a substitute for professional medical care. Always follow your healthcare professional's instructions.

## 2022-04-29 ENCOUNTER — OFFICE VISIT (OUTPATIENT)
Dept: OBGYN | Facility: CLINIC | Age: 32
End: 2022-04-29
Payer: COMMERCIAL

## 2022-04-29 VITALS
WEIGHT: 258 LBS | DIASTOLIC BLOOD PRESSURE: 101 MMHG | BODY MASS INDEX: 44.05 KG/M2 | HEART RATE: 85 BPM | SYSTOLIC BLOOD PRESSURE: 146 MMHG | TEMPERATURE: 97.7 F | HEIGHT: 64 IN

## 2022-04-29 DIAGNOSIS — Z01.419 ENCOUNTER FOR GYNECOLOGICAL EXAMINATION WITHOUT ABNORMAL FINDING: Primary | ICD-10-CM

## 2022-04-29 DIAGNOSIS — F32.9 MAJOR DEPRESSIVE DISORDER WITH CURRENT ACTIVE EPISODE, UNSPECIFIED DEPRESSION EPISODE SEVERITY, UNSPECIFIED WHETHER RECURRENT: ICD-10-CM

## 2022-04-29 DIAGNOSIS — D06.9 CIN III (CERVICAL INTRAEPITHELIAL NEOPLASIA GRADE III) WITH SEVERE DYSPLASIA: ICD-10-CM

## 2022-04-29 DIAGNOSIS — Z30.41 ENCOUNTER FOR SURVEILLANCE OF CONTRACEPTIVE PILLS: ICD-10-CM

## 2022-04-29 DIAGNOSIS — R03.0 ELEVATED BLOOD PRESSURE READING WITHOUT DIAGNOSIS OF HYPERTENSION: ICD-10-CM

## 2022-04-29 PROCEDURE — 87491 CHLMYD TRACH DNA AMP PROBE: CPT | Performed by: OBSTETRICS & GYNECOLOGY

## 2022-04-29 PROCEDURE — 87624 HPV HI-RISK TYP POOLED RSLT: CPT | Performed by: OBSTETRICS & GYNECOLOGY

## 2022-04-29 PROCEDURE — 99395 PREV VISIT EST AGE 18-39: CPT | Performed by: OBSTETRICS & GYNECOLOGY

## 2022-04-29 PROCEDURE — 87591 N.GONORRHOEAE DNA AMP PROB: CPT | Performed by: OBSTETRICS & GYNECOLOGY

## 2022-04-29 PROCEDURE — 88175 CYTOPATH C/V AUTO FLUID REDO: CPT | Performed by: OBSTETRICS & GYNECOLOGY

## 2022-04-29 RX ORDER — LEVONORGESTREL/ETHIN.ESTRADIOL 0.1-0.02MG
1 TABLET ORAL DAILY
Qty: 84 TABLET | Refills: 3 | Status: SHIPPED | OUTPATIENT
Start: 2022-04-29 | End: 2023-10-09

## 2022-04-29 ASSESSMENT — PATIENT HEALTH QUESTIONNAIRE - PHQ9: SUM OF ALL RESPONSES TO PHQ QUESTIONS 1-9: 0

## 2022-04-29 NOTE — PROGRESS NOTES
GYN CLINIC VISIT  2022  CC: annual exam    HPI:  Germania is a 31 year old  female who presents for annual exam.     Menses are regular q 28-30 days and normal lasting 3 days.  Menses flow: normal.  Patient's last menstrual period was 2022.. Using oral contraceptives for contraception.  She is not currently considering pregnancy.   Besides routine health maintenance, she has no other health concerns today .  Denies h/o htn. Reports initially BP can be high at doctor's office but then usually normal. Family h/o htn.   H/o depression. Mood controlled on buproprion. Interested in a new therapist.   PHQ 2022   PHQ-9 Total Score 0   Q9: Thoughts of better off dead/self-harm past 2 weeks Not at all     GYNECOLOGIC HISTORY:  Menarche: 15  Age at first intercourse: 21 Number of lifetime partners: 40  Germania is sexually active with 1male partner(s) and is currently in monogamous relationship .    History sexually transmitted infections:Chlamydia  STI testing offered?  Accepted  MADELINE exposure: No  History of abnormal Pap smear: YES - updated in Problem List and Health Maintenance accordingly  Family history of breast CA: No  Family history of uterine/ovarian CA: No    Family history of colon CA: mgfather    HEALTH MAINTENANCE:  Cholesterol: (  Cholesterol   Date Value Ref Range Status   2021 160 <200 mg/dL Final    History of abnormal lipids: No  Mammo: no . History of abnormal Mammo: Not applicable.  Regular Self Breast Exams: No  Calcium/Vitamin D intake: source:  dairy, veggies Adequate? 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 only for cat/M (all other environmental allergens NEGATIVE), NEGATIVE IgE for shellfish (crab, lobster, shrimp).     Chlamydia 2013     Diagnostic skin and sensitization tests 7/8/13 IgE tests pos. only for cat/M (all other environmental allergens NEGATIVE), NEGATIVE IgE for shellfish (crab, lobster, shrimp).     Intermittent asthma 05/03/2011    follows with primary care.     Past Surgical History:   Procedure Laterality Date     CONIZATION LEEP  03/30/2021    SHAY 3     NO HISTORY OF SURGERY       Family History   Problem Relation Age of Onset     Hypertension Father      Asthma Maternal Grandmother      Cancer - colorectal Maternal Grandfather      C.A.D. No family hx of      Diabetes No family hx of      Cerebrovascular Disease No family hx of      Breast Cancer No family hx of      Prostate Cancer No family hx of      Social History   Aleksandr Cosme  Works as     Socioeconomic History     Marital status: Single   Tobacco Use     Smoking status: Never Smoker     Smokeless tobacco: Never Used   Substance and Sexual Activity     Alcohol use: No     Alcohol/week: 0.0 standard drinks     Drug use: No     Sexual activity: Yes     Partners: Male     Birth control/protection: Pill       Current Outpatient Medications:      albuterol (PROAIR HFA/PROVENTIL HFA/VENTOLIN HFA) 108 (90 Base) MCG/ACT inhaler, Inhale 2 puffs into the lungs every 4 hours as needed for shortness of breath / dyspnea, Disp: 1 Inhaler, Rfl: 3     augmented betamethasone dipropionate (DIPROLENE-AF) 0.05 % external ointment, APPLY TO RASH ON HANDS TWICE DAILY., Disp: , Rfl:      buPROPion (WELLBUTRIN XL) 150 MG 24 hr tablet, TAKE 1 TABLET BY MOUTH IN THE MORNING, Disp: , Rfl:      cetirizine (ZYRTEC) 10 MG tablet, Take 10 mg by mouth daily., Disp: , Rfl:      hydrocortisone 2.5 % ointment, APPLY TO RASH ON FACE TWICE DAILY., Disp: , Rfl:      levonorgestrel-ethinyl estradiol (LESSINA-28)  "0.1-20 MG-MCG tablet, Take 1 tablet by mouth daily +++NEED APPT+++, Disp: 84 tablet, Rfl: 3     Allergies   Allergen Reactions     Cats      Mold      Nkda [No Known Drug Allergies]        Past medical, surgical, social and family history were reviewed and updated in EPIC.    ROS:   C:     NEGATIVE for fever, chills, change in weight  I:       NEGATIVE for worrisome rashes, moles or lesions  E:     NEGATIVE for vision changes or irritation  E/M: NEGATIVE for ear, mouth and throat problems  R:     NEGATIVE for significant cough or SOB  CV:   NEGATIVE for chest pain, palpitations or peripheral edema  GI:     NEGATIVE for nausea, abdominal pain, heartburn, or change in bowel habits  :   NEGATIVE for frequency, dysuria, hematuria, vaginal discharge, or irregular bleeding  M:     NEGATIVE for significant arthralgias or myalgia  N:      NEGATIVE for weakness, dizziness or paresthesias  E:      NEGATIVE for temperature intolerance, skin/hair changes  P:      NEGATIVE for changes in mood or affect.    EXAM:  BP (!) 146/101   Pulse 85   Temp 97.7  F (36.5  C)   Ht 1.626 m (5' 4\")   Wt 117 kg (258 lb)   LMP 04/01/2022   Breastfeeding No   BMI 44.29 kg/m     BMI: Body mass index is 44.29 kg/m .  Constitutional: healthy, alert and no distress  Head: Normocephalic. No masses, lesions, tenderness or abnormalities  Neck: Neck supple. Trachea midline. No adenopathy. Thyroid symmetric, normal size.   Cardiovascular: RRR.   Respiratory: clear bilaterally.   Breast: No nodularity, asymmetry or nipple discharge bilaterally.  Gastrointestinal: Abdomen soft, non-tender, non-distended. No masses, organomegaly.  :  Vulva:  No external lesions, normal female hair distribution, no inguinal adenopathy.    Urethra:  Midline, non-tender, well supported, no discharge  Vagina:  Moist, pink, no abnormal discharge, no lesions  Uterus:  Normal size , non-tender, freely mobile  Ovaries:  No masses appreciated, non-tender, mobile  Rectal " Exam: deferred  Musculoskeletal: extremities normal  Skin: no suspicious lesions or rashes  Psychiatric: Affect appropriate, cooperative,mentation appears normal.     COUNSELING:   Reviewed preventive health counseling, as reflected in patient instructions       Regular exercise       Healthy diet/nutrition       Contraception       Safe sex practices/STD prevention   reports that she has never smoked. She has never used smokeless tobacco.    Body mass index is 44.29 kg/m .    FRAX Risk Assessment    ASSESSMENT:  31 year old female with satisfactory annual exam    1. Encounter for gynecological examination without abnormal finding    - NEISSERIA GONORRHOEA PCR  - CHLAMYDIA TRACHOMATIS PCR    2. Encounter for surveillance of contraceptive pills  Discussed that if BP continues to be high would recommend progesterone only method.   - levonorgestrel-ethinyl estradiol (LESSINA-28) 0.1-20 MG-MCG tablet; Take 1 tablet by mouth daily +++NEED APPT+++  Dispense: 84 tablet; Refill: 3    3. Major depressive disorder with current active episode, unspecified depression episode severity, unspecified whether recurrent    - Adult Mental Health  Referral; Future    4. SHAY III (cervical intraepithelial neoplasia grade III) with severe dysplasia  H/o LEEP 3/30/21 SHAY 2-3, last pap 10/6/21 NIL neg HR HPV. Follow up per [facility]P guidelines.  - Pap diagnostic with HPV    5. Elevated blood pressure reading without diagnosis of hypertension  Recommend patient monitor BPs and follow up with family practice if persistently high.    Perla Hall MD

## 2022-04-29 NOTE — PATIENT INSTRUCTIONS
Bennie Solo,  Nice to meet you today.   As we discussed, if your blood pressure continues to be elevated, I would recommend changing your birth control to a form that does not contain estrogen. I attached some resources about high blood pressure. Please try to check it (like at a pharmacy) and keep track. If you are getting high numbers (>140/90) I recommend making an appointment with family practice to discuss it further.  Please contact our clinic if questions, [phone].  Thanks, Perla Hall MD

## 2022-04-30 LAB
C TRACH DNA SPEC QL NAA+PROBE: NEGATIVE
N GONORRHOEA DNA SPEC QL NAA+PROBE: NEGATIVE

## 2022-05-03 LAB
BKR LAB AP GYN ADEQUACY: NORMAL
BKR LAB AP GYN INTERPRETATION: NORMAL
BKR LAB AP HPV REFLEX: NORMAL
BKR LAB AP LMP: NORMAL
BKR LAB AP PREVIOUS ABNL DX: NORMAL
BKR LAB AP PREVIOUS ABNORMAL: NORMAL
PATH REPORT.COMMENTS IMP SPEC: NORMAL
PATH REPORT.COMMENTS IMP SPEC: NORMAL
PATH REPORT.RELEVANT HX SPEC: NORMAL

## 2022-05-06 ENCOUNTER — PATIENT OUTREACH (OUTPATIENT)
Dept: OBGYN | Facility: CLINIC | Age: 32
End: 2022-05-06
Payer: COMMERCIAL

## 2022-05-06 DIAGNOSIS — D06.9 SEVERE DYSPLASIA OF CERVIX (CIN III): Primary | ICD-10-CM

## 2023-01-08 ENCOUNTER — HEALTH MAINTENANCE LETTER (OUTPATIENT)
Age: 33
End: 2023-01-08

## 2023-04-14 ENCOUNTER — PATIENT OUTREACH (OUTPATIENT)
Dept: OBGYN | Facility: CLINIC | Age: 33
End: 2023-04-14
Payer: COMMERCIAL

## 2023-04-20 ENCOUNTER — PATIENT OUTREACH (OUTPATIENT)
Dept: CARE COORDINATION | Facility: CLINIC | Age: 33
End: 2023-04-20
Payer: COMMERCIAL

## 2023-06-02 ENCOUNTER — HEALTH MAINTENANCE LETTER (OUTPATIENT)
Age: 33
End: 2023-06-02

## 2023-06-09 NOTE — TELEPHONE ENCOUNTER
FYI to provider - Patient is lost to pap tracking follow-up. Attempts to contact pt have been made per reminder process and there has been no reply and/or no appt scheduled. Contact hx listed below.     11/15/13 NIL pap  6/23/17 NIL pap  1/27/21 HSIL pap. Plan colp  3/3/21 [place] SHAY 2-3. Plan: LEEP before 6/3/21  3/30/21 LEEP [facility] - SHYA 2-3, ECC - neg. Cotest due in 6 months, 9/30/21.   10/6/21 NIL Pap, Neg HPV. Plan cotest at next annual exam March, 2022.   4/29/22 NIL, Neg HPV. Plan 1 yr co-test  04/14/23 Reminder Mychart  5/11/23 Reminder call - left message  6/9/23 Lost to follow up

## 2023-09-07 ENCOUNTER — OFFICE VISIT (OUTPATIENT)
Dept: MIDWIFE SERVICES | Facility: CLINIC | Age: 33
End: 2023-09-07
Payer: COMMERCIAL

## 2023-09-07 VITALS
BODY MASS INDEX: 44.8 KG/M2 | DIASTOLIC BLOOD PRESSURE: 72 MMHG | SYSTOLIC BLOOD PRESSURE: 136 MMHG | WEIGHT: 268.9 LBS | HEIGHT: 65 IN

## 2023-09-07 DIAGNOSIS — Z12.4 ENCOUNTER FOR SCREENING FOR CERVICAL CANCER: ICD-10-CM

## 2023-09-07 DIAGNOSIS — Z30.09 GENERAL COUNSELING AND ADVICE ON CONTRACEPTIVE MANAGEMENT: ICD-10-CM

## 2023-09-07 DIAGNOSIS — D06.9 SEVERE DYSPLASIA OF CERVIX (CIN III): ICD-10-CM

## 2023-09-07 DIAGNOSIS — Z11.8 SCREENING FOR VIRAL AND CHLAMYDIAL DISEASES: ICD-10-CM

## 2023-09-07 DIAGNOSIS — Z00.00 ANNUAL PHYSICAL EXAM: Primary | ICD-10-CM

## 2023-09-07 DIAGNOSIS — Z11.59 SCREENING FOR VIRAL AND CHLAMYDIAL DISEASES: ICD-10-CM

## 2023-09-07 DIAGNOSIS — Z11.3 ROUTINE SCREENING FOR STI (SEXUALLY TRANSMITTED INFECTION): ICD-10-CM

## 2023-09-07 DIAGNOSIS — F33.41 RECURRENT MAJOR DEPRESSIVE DISORDER, IN PARTIAL REMISSION (H): ICD-10-CM

## 2023-09-07 LAB
ALBUMIN SERPL BCG-MCNC: 4.1 G/DL (ref 3.5–5.2)
ALP SERPL-CCNC: 39 U/L (ref 35–104)
ALT SERPL W P-5'-P-CCNC: 13 U/L (ref 0–50)
ANION GAP SERPL CALCULATED.3IONS-SCNC: 12 MMOL/L (ref 7–15)
AST SERPL W P-5'-P-CCNC: 21 U/L (ref 0–45)
BILIRUB SERPL-MCNC: 0.3 MG/DL
BUN SERPL-MCNC: 9.5 MG/DL (ref 6–20)
CALCIUM SERPL-MCNC: 9.1 MG/DL (ref 8.6–10)
CHLORIDE SERPL-SCNC: 105 MMOL/L (ref 98–107)
CHOLEST SERPL-MCNC: 172 MG/DL
CREAT SERPL-MCNC: 0.69 MG/DL (ref 0.51–0.95)
DEPRECATED HCO3 PLAS-SCNC: 21 MMOL/L (ref 22–29)
EGFRCR SERPLBLD CKD-EPI 2021: >90 ML/MIN/1.73M2
ERYTHROCYTE [DISTWIDTH] IN BLOOD BY AUTOMATED COUNT: 11.9 % (ref 10–15)
GLUCOSE SERPL-MCNC: 85 MG/DL (ref 70–99)
HBA1C MFR BLD: 5.2 % (ref 0–5.6)
HCT VFR BLD AUTO: 39.6 % (ref 35–47)
HDLC SERPL-MCNC: 49 MG/DL
HGB BLD-MCNC: 13.7 G/DL (ref 11.7–15.7)
LDLC SERPL CALC-MCNC: 102 MG/DL
MCH RBC QN AUTO: 29.3 PG (ref 26.5–33)
MCHC RBC AUTO-ENTMCNC: 34.6 G/DL (ref 31.5–36.5)
MCV RBC AUTO: 85 FL (ref 78–100)
NONHDLC SERPL-MCNC: 123 MG/DL
PLATELET # BLD AUTO: 309 10E3/UL (ref 150–450)
POTASSIUM SERPL-SCNC: 4.3 MMOL/L (ref 3.4–5.3)
PROT SERPL-MCNC: 7.3 G/DL (ref 6.4–8.3)
RBC # BLD AUTO: 4.68 10E6/UL (ref 3.8–5.2)
SODIUM SERPL-SCNC: 138 MMOL/L (ref 136–145)
TRIGL SERPL-MCNC: 106 MG/DL
TSH SERPL DL<=0.005 MIU/L-ACNC: 0.95 UIU/ML (ref 0.3–4.2)
WBC # BLD AUTO: 6.5 10E3/UL (ref 4–11)

## 2023-09-07 PROCEDURE — 87389 HIV-1 AG W/HIV-1&-2 AB AG IA: CPT

## 2023-09-07 PROCEDURE — 87624 HPV HI-RISK TYP POOLED RSLT: CPT

## 2023-09-07 PROCEDURE — 99395 PREV VISIT EST AGE 18-39: CPT

## 2023-09-07 PROCEDURE — 84443 ASSAY THYROID STIM HORMONE: CPT

## 2023-09-07 PROCEDURE — G0145 SCR C/V CYTO,THINLAYER,RESCR: HCPCS

## 2023-09-07 PROCEDURE — 80053 COMPREHEN METABOLIC PANEL: CPT

## 2023-09-07 PROCEDURE — 36415 COLL VENOUS BLD VENIPUNCTURE: CPT

## 2023-09-07 PROCEDURE — 87491 CHLMYD TRACH DNA AMP PROBE: CPT

## 2023-09-07 PROCEDURE — 85027 COMPLETE CBC AUTOMATED: CPT

## 2023-09-07 PROCEDURE — 86803 HEPATITIS C AB TEST: CPT

## 2023-09-07 PROCEDURE — 99213 OFFICE O/P EST LOW 20 MIN: CPT | Mod: 25

## 2023-09-07 PROCEDURE — 80061 LIPID PANEL: CPT

## 2023-09-07 PROCEDURE — 83036 HEMOGLOBIN GLYCOSYLATED A1C: CPT

## 2023-09-07 PROCEDURE — 87591 N.GONORRHOEAE DNA AMP PROB: CPT

## 2023-09-07 PROCEDURE — 86780 TREPONEMA PALLIDUM: CPT

## 2023-09-07 RX ORDER — MISOPROSTOL 200 UG/1
200 TABLET ORAL ONCE
Qty: 1 TABLET | Refills: 0 | Status: SHIPPED | OUTPATIENT
Start: 2023-09-07 | End: 2023-09-07

## 2023-09-07 ASSESSMENT — ENCOUNTER SYMPTOMS
CONSTIPATION: 0
HEMATOCHEZIA: 0
PARESTHESIAS: 0
MYALGIAS: 0
PALPITATIONS: 0
EYE PAIN: 0
DYSURIA: 0
NAUSEA: 0
FEVER: 0
HEADACHES: 0
CHILLS: 0
SORE THROAT: 0
ARTHRALGIAS: 0
DIARRHEA: 0
COUGH: 0
WEAKNESS: 0
DIZZINESS: 0
HEMATURIA: 0
HEARTBURN: 0
ABDOMINAL PAIN: 0
FREQUENCY: 0
JOINT SWELLING: 0
BREAST MASS: 0
SHORTNESS OF BREATH: 0
NERVOUS/ANXIOUS: 0

## 2023-09-07 NOTE — PROGRESS NOTES
.Germania is a 32 year old  female who presents for annual exam.     Besides routine health maintenance, she would like to discuss birth control options .  HPI:    - Feeling well overall.   - Ran out of birth control pills a few months ago. Feels as though her mental health has improved in that time. Interested in discussing other options, not wanting to go back to those pills. Has not tried other methods of contraception. Interested in low or no hormone options. Priority is efficacy of method. Not currently partnered, no chance of pregnancy.   - Wellbutrin 300 per day managed per online doctor; interested in establishing with mental health at [place]; requesting behavioral health referral.  - History of SHAY III; due for pap today.  - States desire for routine health screening labs today.  Menses are regular q 28-30 days and normal lasting 3 days.   Menses flow: normal.    Patient's last menstrual period was 2023..   Using condoms for contraception.    She is not currently considering pregnancy.    REPRODUCTIVE/GYNECOLOGIC HISTORY:  Germania is sexually active with male partner(s) and is currently in monogamous relationship.   STI testing offered?  Accepted  History of abnormal Pap smear:  Yes  SOCIAL HISTORY  Abuse: does not report having previously been physical or sexually abused.    Do you feel safe in your environment? YES      She  reports that she has never smoked. She has never used smokeless tobacco.    HEALTH MAINTENANCE: Reviewed and updated    HEALTHY HABITS  Eating habits: eats regular meals and less red meat  Calcium/Vitamin D intake: source:  dairy Adequate? unlikely  Exercise: How often do you exercise? Infrequently; walking  Have you had an eye exam in the last two years? YES     Do you routinely see the dentist once or twice yearly? YES         HISTORY:  OB History    Para Term  AB Living   0 0 0 0 0 0   SAB IAB Ectopic Multiple Live Births   0 0 0 0 0     Past Medical  History:   Diagnosis Date    Abnormal Pap smear of cervix 01/27/2021    Allergic rhinitis due to animal dander     Allergy to mold spores     7/8/13 IgE tests pos. only for cat/M (all other environmental allergens NEGATIVE), NEGATIVE IgE for shellfish (crab, lobster, shrimp).    Chlamydia 2013    Diagnostic skin and sensitization tests 7/8/13 IgE tests pos. only for cat/M (all other environmental allergens NEGATIVE), NEGATIVE IgE for shellfish (crab, lobster, shrimp).    Intermittent asthma 05/03/2011    follows with primary care.     Past Surgical History:   Procedure Laterality Date    ARTHROPLASTY ANKLE Left 2013    Broken ankle    CONIZATION LEEP  03/30/2021    SHAY 3     Family History   Problem Relation Age of Onset    Hypertension Father     Asthma Maternal Grandmother     Cancer - colorectal Maternal Grandfather     C.A.D. No family hx of     Diabetes No family hx of     Cerebrovascular Disease No family hx of     Breast Cancer No family hx of     Prostate Cancer No family hx of      Social History     Socioeconomic History    Marital status: Single   Tobacco Use    Smoking status: Never    Smokeless tobacco: Never   Vaping Use    Vaping Use: Never used   Substance and Sexual Activity    Alcohol use: Yes    Drug use: No    Sexual activity: Yes     Partners: Male     Birth control/protection: Pill       Current Outpatient Medications:     albuterol (PROAIR HFA/PROVENTIL HFA/VENTOLIN HFA) 108 (90 Base) MCG/ACT inhaler, Inhale 2 puffs into the lungs every 4 hours as needed for shortness of breath / dyspnea, Disp: 1 Inhaler, Rfl: 3    buPROPion (WELLBUTRIN XL) 150 MG 24 hr tablet, 300 mg, Disp: , Rfl:     cetirizine (ZYRTEC) 10 MG tablet, Take 10 mg by mouth daily., Disp: , Rfl:     augmented betamethasone dipropionate (DIPROLENE-AF) 0.05 % external ointment, APPLY TO RASH ON HANDS TWICE DAILY. (Patient not taking: Reported on 9/7/2023), Disp: , Rfl:     hydrocortisone 2.5 % ointment, APPLY TO RASH ON FACE TWICE  "DAILY. (Patient not taking: Reported on 9/7/2023), Disp: , Rfl:     levonorgestrel-ethinyl estradiol (LESSINA-28) 0.1-20 MG-MCG tablet, Take 1 tablet by mouth daily +++NEED APPT+++ (Patient not taking: Reported on 9/7/2023), Disp: 84 tablet, Rfl: 3     Allergies   Allergen Reactions    Cats     Mold     Nkda [No Known Drug Allergy]        Past medical, surgical, social and family history were reviewed and updated in EPIC.    ROS:   12 point review of systems negative other than symptoms noted below or in the HPI.    PHYSICAL EXAM:  /72   Ht 1.651 m (5' 5\")   Wt 122 kg (268 lb 14.4 oz)   LMP 08/24/2023   BMI 44.75 kg/m     BMI: Body mass index is 44.75 kg/m .  Constitutional: healthy, alert and no distress  Neck: symmetrical, thyroid normal size, no masses present, no lymphadenopathy present.   Cardiovascular: RRR, no murmurs present  Respiratory: breathing unlabored, lungs CTA bilaterally  Breast: normal without masses, tenderness or nipple discharge and no palpable axillary masses or adenopathy  Gastrointestinal: abdomen soft, non-tender, bowel sounds present, no hepatosplenomegaly noted  PELVIC EXAM:  Vulva: No lesions, no adenopathy, BUS WNL  Vagina: Moist, pink, discharge normal  well rugated, no lesions  Cervix:smooth, pink, no visible lesions  Uterus: Normal size, non-tender, mobile  Ovaries: No masses palpated  Rectal exam: deferred    ASSESSMENT/PLAN:    ICD-10-CM    1. Annual physical exam  Z00.00 Pap thin layer screen with HPV - recommended age 30 - 65 years     NEISSERIA GONORRHOEA PCR     CHLAMYDIA TRACHOMATIS PCR     Treponema Abs w Reflex to RPR and Titer     HIV Antigen Antibody Combo     Hepatitis C Antibody     CBC with platelets     Hemoglobin A1c     Lipid Profile (Chol, Trig, HDL, LDL calc)     TSH with free T4 reflex     Comprehensive metabolic panel (BMP + Alb, Alk Phos, ALT, AST, Total. Bili, TP)     Treponema Abs w Reflex to RPR and Titer     HIV Antigen Antibody Combo     Hepatitis " C Antibody     CBC with platelets     Hemoglobin A1c     Lipid Profile (Chol, Trig, HDL, LDL calc)     TSH with free T4 reflex     Comprehensive metabolic panel (BMP + Alb, Alk Phos, ALT, AST, Total. Bili, TP)     NEISSERIA GONORRHOEA PCR     CHLAMYDIA TRACHOMATIS PCR     HPV Hold (Lab Only)      2. Anxiety and depression  F41.9 Adult Mental Health  Referral    F32.A       3. Severe dysplasia of cervix (SHAY III)  D06.9       4. Encounter for screening for cervical cancer  Z12.4 Pap thin layer screen with HPV - recommended age 30 - 65 years     HPV Hold (Lab Only)      5. Screening for viral and chlamydial diseases  Z11.59 NEISSERIA GONORRHOEA PCR    Z11.8 CHLAMYDIA TRACHOMATIS PCR     NEISSERIA GONORRHOEA PCR     CHLAMYDIA TRACHOMATIS PCR      6. Routine screening for STI (sexually transmitted infection)  Z11.3 NEISSERIA GONORRHOEA PCR     CHLAMYDIA TRACHOMATIS PCR     Treponema Abs w Reflex to RPR and Titer     HIV Antigen Antibody Combo     Hepatitis C Antibody     Treponema Abs w Reflex to RPR and Titer     HIV Antigen Antibody Combo     Hepatitis C Antibody     NEISSERIA GONORRHOEA PCR     CHLAMYDIA TRACHOMATIS PCR      7. General counseling and advice on contraceptive management  Z30.09 misoprostol (CYTOTEC) 200 MCG tablet      8. BMI 40.0-44.9, adult (H)  Z68.41         Results for orders placed or performed in visit on 09/07/23   Treponema Abs w Reflex to RPR and Titer     Status: Normal   Result Value Ref Range    Treponema Antibody Total Nonreactive Nonreactive   HIV Antigen Antibody Combo     Status: Normal   Result Value Ref Range    HIV Antigen Antibody Combo Nonreactive Nonreactive   Hepatitis C Antibody     Status: Normal   Result Value Ref Range    Hepatitis C Antibody Nonreactive Nonreactive    Narrative    Assay performance characteristics have not been established for newborns, infants, and children.   CBC with platelets     Status: Normal   Result Value Ref Range    WBC Count 6.5 4.0 -  11.0 10e3/uL    RBC Count 4.68 3.80 - 5.20 10e6/uL    Hemoglobin 13.7 11.7 - 15.7 g/dL    Hematocrit 39.6 35.0 - 47.0 %    MCV 85 78 - 100 fL    MCH 29.3 26.5 - 33.0 pg    MCHC 34.6 31.5 - 36.5 g/dL    RDW 11.9 10.0 - 15.0 %    Platelet Count 309 150 - 450 10e3/uL   Hemoglobin A1c     Status: Normal   Result Value Ref Range    Hemoglobin A1C 5.2 0.0 - 5.6 %   Lipid Profile (Chol, Trig, HDL, LDL calc)     Status: Abnormal   Result Value Ref Range    Cholesterol 172 <200 mg/dL    Triglycerides 106 <150 mg/dL    Direct Measure HDL 49 (L) >=50 mg/dL    LDL Cholesterol Calculated 102 (H) <=100 mg/dL    Non HDL Cholesterol 123 <130 mg/dL    Narrative    Cholesterol  Desirable:  <200 mg/dL    Triglycerides  Normal:  Less than 150 mg/dL  Borderline High:  150-199 mg/dL  High:  200-499 mg/dL  Very High:  Greater than or equal to 500 mg/dL    Direct Measure HDL  Female:  Greater than or equal to 50 mg/dL   Male:  Greater than or equal to 40 mg/dL    LDL Cholesterol  Desirable:  <100mg/dL  Above Desirable:  100-129 mg/dL   Borderline High:  130-159 mg/dL   High:  160-189 mg/dL   Very High:  >= 190 mg/dL    Non HDL Cholesterol  Desirable:  130 mg/dL  Above Desirable:  130-159 mg/dL  Borderline High:  160-189 mg/dL  High:  190-219 mg/dL  Very High:  Greater than or equal to 220 mg/dL   TSH with free T4 reflex     Status: Normal   Result Value Ref Range    TSH 0.95 0.30 - 4.20 uIU/mL   Comprehensive metabolic panel (BMP + Alb, Alk Phos, ALT, AST, Total. Bili, TP)     Status: Abnormal   Result Value Ref Range    Sodium 138 136 - 145 mmol/L    Potassium 4.3 3.4 - 5.3 mmol/L    Chloride 105 98 - 107 mmol/L    Carbon Dioxide (CO2) 21 (L) 22 - 29 mmol/L    Anion Gap 12 7 - 15 mmol/L    Urea Nitrogen 9.5 6.0 - 20.0 mg/dL    Creatinine 0.69 0.51 - 0.95 mg/dL    Calcium 9.1 8.6 - 10.0 mg/dL    Glucose 85 70 - 99 mg/dL    Alkaline Phosphatase 39 35 - 104 U/L    AST 21 0 - 45 U/L    ALT 13 0 - 50 U/L    Protein Total 7.3 6.4 - 8.3 g/dL     Albumin 4.1 3.5 - 5.2 g/dL    Bilirubin Total 0.3 <=1.2 mg/dL    GFR Estimate >90 >60 mL/min/1.73m2   NEISSERIA GONORRHOEA PCR     Status: Normal    Specimen: Vagina; Swab   Result Value Ref Range    Neisseria gonorrhoeae Negative Negative   CHLAMYDIA TRACHOMATIS PCR     Status: Normal    Specimen: Vagina; Swab   Result Value Ref Range    Chlamydia trachomatis Negative Negative       #Annual Preventative Care  - Normal physical exam  - Mood is stable  - Labs: CBC, CMP, lipid profile, Hgb A1c, TSH with reflex to free T4  - Routine STI screening: GC/CT; HIV, HCV, syphilis  - Cervical Cancer screening due today; plan to follow-up based on results of pap.  - BMI 44.75    #Depression  - Referral to  for management of symptoms  - Does not need prescription ordered/refill at this time  - Reviewed potential impact of contraception on mood    #Contraceptive Management  - Reviewed contraceptive options. Pt prefers low/no hormone to avoid impact on mental health. Pt elects for IUD; deciding between hormonal and non-hormonal options. Handouts provided. Encouraged pt to review bedsider.org  - Reviewed IUD placement procedure. Encouraged 800 mg ibuprofen OTC 1-2 hours prior to procedure.   - 200 mcg cytotec PV ordered to be placed 2-4 hours prior to IUD placement appointment.   - RTC as desired for IUD placement appointment.        COUNSELING:   Reviewed preventive health counseling, as reflected in patient instructions  Special attention given to:        Regular exercise       Healthy diet/nutrition       Vision screening       Contraception       Osteoporosis prevention/bone health   reports that she has never smoked. She has never used smokeless tobacco.    20 minutes spent providing care for concerns outside of routine health maintenance.   50 minutes spent by TOTAL me on the date of the encounter doing chart review, history and exam, documentation and further activities per the note    VAHID Rodriguez CNM

## 2023-09-08 LAB
C TRACH DNA SPEC QL NAA+PROBE: NEGATIVE
HCV AB SERPL QL IA: NONREACTIVE
HIV 1+2 AB+HIV1 P24 AG SERPL QL IA: NONREACTIVE
N GONORRHOEA DNA SPEC QL NAA+PROBE: NEGATIVE
T PALLIDUM AB SER QL: NONREACTIVE

## 2023-09-08 NOTE — RESULT ENCOUNTER NOTE
Informed of CBC, A1c, lipids, TSH and CMP via [facility]. STI labs in process     VAHID Del Toro, ARMANDO

## 2023-09-11 LAB
BKR LAB AP GYN ADEQUACY: NORMAL
BKR LAB AP GYN INTERPRETATION: NORMAL
BKR LAB AP HPV REFLEX: NORMAL
BKR LAB AP PREVIOUS ABNL DX: NORMAL
BKR LAB AP PREVIOUS ABNORMAL: NORMAL
PATH REPORT.COMMENTS IMP SPEC: NORMAL
PATH REPORT.COMMENTS IMP SPEC: NORMAL
PATH REPORT.RELEVANT HX SPEC: NORMAL

## 2023-09-15 ENCOUNTER — PATIENT OUTREACH (OUTPATIENT)
Dept: OBGYN | Facility: CLINIC | Age: 33
End: 2023-09-15
Payer: COMMERCIAL

## 2023-09-29 DIAGNOSIS — Z01.812 PRE-PROCEDURAL LABORATORY EXAMINATION: Primary | ICD-10-CM

## 2023-10-09 ENCOUNTER — LAB (OUTPATIENT)
Dept: LAB | Facility: CLINIC | Age: 33
End: 2023-10-09
Payer: COMMERCIAL

## 2023-10-09 ENCOUNTER — OFFICE VISIT (OUTPATIENT)
Dept: MIDWIFE SERVICES | Facility: CLINIC | Age: 33
End: 2023-10-09
Payer: COMMERCIAL

## 2023-10-09 VITALS — WEIGHT: 270.8 LBS | BODY MASS INDEX: 45.06 KG/M2 | SYSTOLIC BLOOD PRESSURE: 142 MMHG | DIASTOLIC BLOOD PRESSURE: 78 MMHG

## 2023-10-09 DIAGNOSIS — Z97.5 IUD (INTRAUTERINE DEVICE) IN PLACE: ICD-10-CM

## 2023-10-09 DIAGNOSIS — Z30.430 ENCOUNTER FOR INSERTION OF INTRAUTERINE CONTRACEPTIVE DEVICE: Primary | ICD-10-CM

## 2023-10-09 DIAGNOSIS — Z01.812 PRE-PROCEDURE LAB EXAM: ICD-10-CM

## 2023-10-09 DIAGNOSIS — Z01.812 PRE-PROCEDURAL LABORATORY EXAMINATION: ICD-10-CM

## 2023-10-09 LAB — HCG UR QL: NEGATIVE

## 2023-10-09 PROCEDURE — 58300 INSERT INTRAUTERINE DEVICE: CPT

## 2023-10-09 PROCEDURE — 81025 URINE PREGNANCY TEST: CPT

## 2023-10-09 NOTE — PATIENT INSTRUCTIONS
Your Intrauterine Device (IUD)     What to watch for right after IUD placement:    Some women may experience uterine cramps, bleeding, and/or dizziness during and right after IUD placement.     To help minimize the cramps, you may take ibuprofen 600 mg with food. These symptoms should improve over the next 24 hours.  Mild cramping may be present for a few days after IUD placement. You may continue taking ibuprofen as directed if needed.    You may experience spotting or bleeding for the first few weeks to months after IUD placement.      Other side effects can include:  Anemia, hemorrhage, partial or complete expulsion of device, uterine or cervical perforation, embedding of IUD in the uterine wall, increased risk of pelvic inflammatory disease.  Women who become pregnant with an IUD in place are at higher risk of an ectopic pregnancy.  There is also a higher risk of miscarriage when pregnancy occurs with an IUD in place.    Use condoms or abstain from sex for 7 days after the insertion of your Mirena or Kyleena or Alivia  IUD.  This is not necessary if you had a ParaGard IUD placed.    If you experience fever, chills, abdominal pain, worsening pelvic pain, dizziness, unusually heavy vaginal bleeding, suspected expulsion of device or foul smelling vaginal discharge please call the clinic for evaluation.    Please schedule an appointment at the clinic for a string check 4-6 weeks after IUD placement    Your periods may change (Alivia/Kyleena/Mirena):    For the first 3 to 6 months, your monthly period may become irregular. You may also have frequent spotting or light bleeding. A few women have heavy bleeding during this time. After your body adjusts, the number of bleeding days is likely to decrease (but may remain irregular), and you may even find that your periods stop altogether for as long as Alivia/Mirena is in place.  Your periods will return rapidly once the IUD is removed.      ParaGard IUD users:    ParaGard  IUD users may experience heavier than normal cycles while their IUD is in place, this is considered normal   Back-up contraception is not needed      Checking for your strings:    We encourage everyone with an IUD in place to check for their strings monthly    You may check your own IUD strings by inserting a finger into the vagina and feeling the strings as they exit the cervix.  The strings will initially feel firm, like fishing line, but will soften over a few weeks.  After the strings have softened, you or your partner should not be able to feel the strings during intercourse.     If the string length greatly changes or if you cannot feel your strings at all please make an appointment to see you midwife and use a backup method of contraception like a condom.    If you can feel something hard/plastic like the IUD may not be in the correct place. You should then see your healthcare provider to have the position confirmed with ultrasound.       Remember:    IUD's do not protect against HIV or STIs.  IUD's do not prevent the formation of ovarian cysts.  IUD's do not typically reduce acne or cause weight gain or mood changes.    For more information:  [email or web address]-us.com/    The ParaGard IUD is effective for 12 years.  The Alivia IUD is effective for 3 years.  The Kyleena IUD is effective for 5 years.  The Mirena IUD is effective for 7 years.  Your IUD can easily be removed by a healthcare professional at any time.    Do not try to remove the IUD yourself.      If you have questions or concerns please call:    [facility]   [phone]

## 2023-10-09 NOTE — PROGRESS NOTES
IUD Insertion:  CONSULT:    Is a pregnancy test required: Yes.  Was it positive or negative?  Negative  Was a consent obtained?  Yes    Subjective: Germania Alcala is a 32 year old  presents for IUD and desires Alivia type IUD.    Patient has been given the opportunity to ask questions about all forms of birth control, including all options appropriate for Germania Alcala. Discussed that no method of birth control, except abstinence is 100% effective against pregnancy or sexually transmitted infection.     Germania Alcala understands she may have the IUD removed at any time. IUD should be removed by a health care provider.    The entire insertion procedure was reviewed with the patient, including care after placement.    Patient's last menstrual period was 2023.  No allergy to betadine or shellfish. Patient declines STD screening  HCG Qual Urine   Date Value Ref Range Status   2021 Negative NEG^Negative Final     Comment:     This test is for screening purposes.  Results should be interpreted along with   the clinical picture.  Confirmation testing is available if warranted by   ordering [id number], HCG Quantitative Pregnancy.       hCG Urine Qualitative   Date Value Ref Range Status   10/09/2023 Negative Negative Final     Comment:     This test is for screening purposes.  Results should be interpreted along with the clinical picture.  Confirmation testing is available if warranted by ordering [id number], HCG Quantitative Pregnancy.         BP (!) 142/78   Wt 122.8 kg (270 lb 12.8 oz)   LMP 2023   BMI 45.06 kg/m      Pelvic Exam:   EG/BUS: normal genital architecture without lesions, erythema or abnormal secretions.   Vagina: moist, pink, rugae with physiologic discharge and secretions  Cervix: nuliparous no lesions and pink, moist, closed, without lesion   Uterus: midposition, mobile, no pain    PROCEDURE NOTE: -- IUD Insertion    Reason for Insertion: contraception    Premedicated with  ibuprofen.  Under sterile technique, cervix was visualized with speculum and prepped with Betadine solution swab x 3. Tenaculum was placed for stability. The uterus was gently straightened and sounded to 7.5 cm. IUD prepared for placement, and IUD inserted according to 's instructions without difficulty or significant resitance, and deployed at the fundus. The strings were visualized and trimmed to 3.0 cm from the external os. Tenaculum was removed and hemostasis noted. Speculum removed.  Patient tolerated procedure well.    Lot # [id number]  Exp: 01/01/2025    EBL: minimal    Complications: none    ASSESSMENT:     ICD-10-CM    1. Pre-procedure lab exam  Z01.812 HCG qualitative urine      2. Encounter for insertion of intrauterine contraceptive device  Z30.430              PLAN:    Given 's handouts, including when to have IUD removed, list of danger s/sx, side effects and follow up recommended. Encouraged condom use for prevention of STD. Back up contraception advised for 7 days if progestin method. Advised to call for any fever, for prolonged or severe pain or bleeding, abnormal vaginal discharge, or unable to palpate strings. She was advised to use pain medications (ibuprofen) as needed for mild to moderate pain. Advised to follow-up in clinic in 4-6 weeks for IUD string check if unable to find strings or as directed by provider.     VAHID Rodriguez CNM

## 2024-01-21 ENCOUNTER — MYC MEDICAL ADVICE (OUTPATIENT)
Dept: MIDWIFE SERVICES | Facility: CLINIC | Age: 34
End: 2024-01-21
Payer: COMMERCIAL

## 2024-01-21 DIAGNOSIS — F33.41 RECURRENT MAJOR DEPRESSIVE DISORDER, IN PARTIAL REMISSION (H): Primary | ICD-10-CM

## 2024-01-22 RX ORDER — BUPROPION HYDROCHLORIDE 150 MG/1
300 TABLET ORAL EVERY MORNING
Qty: 30 TABLET | Refills: 1 | Status: SHIPPED | OUTPATIENT
Start: 2024-01-22

## 2024-02-21 DIAGNOSIS — F33.41 RECURRENT MAJOR DEPRESSIVE DISORDER, IN PARTIAL REMISSION (H): ICD-10-CM

## 2024-02-21 NOTE — TELEPHONE ENCOUNTER
"Requested Prescriptions   Pending Prescriptions Disp Refills    buPROPion (WELLBUTRIN XL) 150 MG 24 hr tablet 30 tablet 1     Sig: Take 2 tablets (300 mg) by mouth every morning       SSRIs Protocol Failed - 2/21/2024  3:05 PM        Failed - PHQ-9 score less than 5 in past 6 months     Please review last PHQ-9 score.           Passed - Medication is Bupropion     If the medication is Bupropion (Wellbutrin), and the patient is taking for smoking cessation; OK to refill.          Passed - Medication is active on med list        Passed - Patient is age 18 or older        Passed - No active pregnancy on record        Passed - No positive pregnancy test in last 12 months        Passed - Recent (6 mo) or future (30 days) visit within the authorizing provider's specialty     Patient had office visit in the last 6 months or has a visit in the next 30 days with authorizing provider or within the authorizing provider's specialty.  See \"Patient Info\" tab in inbasket, or \"Choose Columns\" in Meds & Orders section of the refill encounter.               Last Written Prescription Date:  01/22/24  Last Fill Quantity: 30,  # refills: 1   Last office visit: 10/9/2023 with provider: Anabell Mckeon   Future Office Visit:  none          "

## 2024-02-22 RX ORDER — BUPROPION HYDROCHLORIDE 150 MG/1
300 TABLET ORAL EVERY MORNING
Qty: 30 TABLET | Refills: 1 | OUTPATIENT
Start: 2024-02-22

## 2024-02-22 NOTE — TELEPHONE ENCOUNTER
Anabell Rodas CNM     TL    1/22/24 12:14 PM  Note  Refill sent per patient request. Pt to schedule follow-up with psych     VAHID Del Toro CNM          No further refills from this provider.  Psychiatry to take over.    Michelle Mann RN on 2/22/2024 at 9:59 AM

## 2024-08-19 ENCOUNTER — PATIENT OUTREACH (OUTPATIENT)
Dept: OBGYN | Facility: CLINIC | Age: 34
End: 2024-08-19
Payer: COMMERCIAL

## 2024-10-30 ENCOUNTER — OFFICE VISIT (OUTPATIENT)
Dept: OBGYN | Facility: CLINIC | Age: 34
End: 2024-10-30
Payer: COMMERCIAL

## 2024-10-30 VITALS
BODY MASS INDEX: 43.02 KG/M2 | HEIGHT: 64 IN | WEIGHT: 252 LBS | DIASTOLIC BLOOD PRESSURE: 76 MMHG | SYSTOLIC BLOOD PRESSURE: 138 MMHG

## 2024-10-30 DIAGNOSIS — F33.41 RECURRENT MAJOR DEPRESSIVE DISORDER, IN PARTIAL REMISSION (H): ICD-10-CM

## 2024-10-30 DIAGNOSIS — Z11.3 SCREEN FOR STD (SEXUALLY TRANSMITTED DISEASE): ICD-10-CM

## 2024-10-30 DIAGNOSIS — Z01.419 ENCOUNTER FOR GYNECOLOGICAL EXAMINATION WITHOUT ABNORMAL FINDING: Primary | ICD-10-CM

## 2024-10-30 DIAGNOSIS — Z97.5 IUD (INTRAUTERINE DEVICE) IN PLACE: ICD-10-CM

## 2024-10-30 DIAGNOSIS — D06.9 SEVERE DYSPLASIA OF CERVIX (CIN III): ICD-10-CM

## 2024-10-30 PROCEDURE — G0145 SCR C/V CYTO,THINLAYER,RESCR: HCPCS | Performed by: NURSE PRACTITIONER

## 2024-10-30 PROCEDURE — 87491 CHLMYD TRACH DNA AMP PROBE: CPT | Performed by: NURSE PRACTITIONER

## 2024-10-30 PROCEDURE — 36415 COLL VENOUS BLD VENIPUNCTURE: CPT | Performed by: NURSE PRACTITIONER

## 2024-10-30 PROCEDURE — 87389 HIV-1 AG W/HIV-1&-2 AB AG IA: CPT | Performed by: NURSE PRACTITIONER

## 2024-10-30 PROCEDURE — 99395 PREV VISIT EST AGE 18-39: CPT | Performed by: NURSE PRACTITIONER

## 2024-10-30 PROCEDURE — 99459 PELVIC EXAMINATION: CPT | Performed by: NURSE PRACTITIONER

## 2024-10-30 PROCEDURE — 87624 HPV HI-RISK TYP POOLED RSLT: CPT | Performed by: NURSE PRACTITIONER

## 2024-10-30 PROCEDURE — 87591 N.GONORRHOEAE DNA AMP PROB: CPT | Performed by: NURSE PRACTITIONER

## 2024-10-30 PROCEDURE — 86803 HEPATITIS C AB TEST: CPT | Performed by: NURSE PRACTITIONER

## 2024-10-30 PROCEDURE — 99213 OFFICE O/P EST LOW 20 MIN: CPT | Mod: 25 | Performed by: NURSE PRACTITIONER

## 2024-10-30 RX ORDER — BUPROPION HYDROCHLORIDE 150 MG/1
300 TABLET ORAL EVERY MORNING
Qty: 90 TABLET | Refills: 3 | Status: SHIPPED | OUTPATIENT
Start: 2024-10-30

## 2024-10-30 ASSESSMENT — ANXIETY QUESTIONNAIRES
GAD7 TOTAL SCORE: 2
7. FEELING AFRAID AS IF SOMETHING AWFUL MIGHT HAPPEN: NOT AT ALL
2. NOT BEING ABLE TO STOP OR CONTROL WORRYING: NOT AT ALL
3. WORRYING TOO MUCH ABOUT DIFFERENT THINGS: NOT AT ALL
IF YOU CHECKED OFF ANY PROBLEMS ON THIS QUESTIONNAIRE, HOW DIFFICULT HAVE THESE PROBLEMS MADE IT FOR YOU TO DO YOUR WORK, TAKE CARE OF THINGS AT HOME, OR GET ALONG WITH OTHER PEOPLE: NOT DIFFICULT AT ALL
6. BECOMING EASILY ANNOYED OR IRRITABLE: SEVERAL DAYS
1. FEELING NERVOUS, ANXIOUS, OR ON EDGE: SEVERAL DAYS
GAD7 TOTAL SCORE: 2
5. BEING SO RESTLESS THAT IT IS HARD TO SIT STILL: NOT AT ALL

## 2024-10-30 ASSESSMENT — PATIENT HEALTH QUESTIONNAIRE - PHQ9
SUM OF ALL RESPONSES TO PHQ QUESTIONS 1-9: 6
5. POOR APPETITE OR OVEREATING: NOT AT ALL

## 2024-10-30 NOTE — PROGRESS NOTES
Germania is a 34 year old  female who presents for annual exam.     Besides routine health maintenance, she has no other health concerns today .    HPI:  The patient's PCP is  [facility]    Patient here today for her annual GYN exam.  She is due for Pap smear.  She does have a history of SHAY-3.  She has a Alivia IUD and is amenorrheic on the method for the most buildup.  She has a light 1 day menstrual cycle.  She is doing well on Wellbutrin.        GYNECOLOGIC HISTORY:    Patient's last menstrual period was 2024 (approximate).    Regular menses? yes  Menses every 28-30 days.  Length of menses: 1 days    Her current contraception method is: IUD.  She  reports that she has never smoked. She has never used smokeless tobacco.    Patient is not sexually active.  STD testing offered?  Accepted  Last PHQ-9 score on record =       10/30/2024     3:19 PM   PHQ-9 SCORE   PHQ-9 Total Score 6     Last GAD7 score on record =       10/30/2024     3:19 PM   DAVID-7 SCORE   Total Score 2         HEALTH MAINTENANCE:  Cholesterol: (  Cholesterol   Date Value Ref Range Status   2023 172 <200 mg/dL Final   2021 160 <200 mg/dL Final      Pap:   Lab Results   Component Value Date    GYNINTERP  2023     Negative for Intraepithelial Lesion or Malignancy (NILM)    GYNINTERP  2022     Negative for Intraepithelial Lesion or Malignancy (NILM)    GYNINTERP  10/06/2021     Negative for Intraepithelial Lesion or Malignancy (NILM)    PAP HSIL 2021    PAP NIL 2017    PAP NIL 11/15/2013       Health maintenance updated:  yes    Care Gaps     Overdue          Never done ADVANCE CARE PLANNING (Every 5 Years)     2021 ASTHMA CONTROL TEST (Every 6 Months)  Last completed:  Pneumococcal Vaccine: Pediatrics (0 to 5 Years) and At-Risk Patients (6 to 64 Years) (2 of 2 - PCV)  Last completed: 2021     OCT 29  2022 PHQ-9 (Every 6 Months)  Last completed:  2022     SEP 7  2024 YEARLY PREVENTIVE VISIT (Yearly)  Last completed: Sep 7, 2023     SEP 7  2024 ASTHMA ACTION PLAN (Yearly)  Last completed: Sep 7, 2023     SEP 7  2024 PAP FOLLOW-UP (Once)  Last completed: Sep 7, 2023     SEP 7  2024 HPV FOLLOW-UP (Once)  Last completed: Sep 7, 2023         Upcoming          2027 DTAP/TDAP/TD IMMUNIZATION (8 - Td or Tdap)  Last completed: 2017     OCT 21  2067 RSV VACCINE (1 - 1-dose 75+ series)       HISTORY:  OB History    Para Term  AB Living   0 0 0 0 0 0   SAB IAB Ectopic Multiple Live Births   0 0 0 0 0       Patient Active Problem List   Diagnosis    Intermittent asthma    Fracture of lateral malleolus of left ankle    Allergy to mold spores    Allergic rhinitis due to animal dander    Diagnostic skin and sensitization tests(aka ALLERGENS)    Class 3 obesity without serious comorbidity with body mass index (BMI) of 40.0 to 44.9 in adult    Ankle fracture, left    Severe dysplasia of cervix (SHAY III)    IUD (intrauterine device) in place     Past Surgical History:   Procedure Laterality Date    ARTHROPLASTY ANKLE Left     Broken ankle    CONIZATION LEEP  2021    SHAY 3      Social History     Tobacco Use    Smoking status: Never    Smokeless tobacco: Never   Substance Use Topics    Alcohol use: Yes      Problem (# of Occurrences) Relation (Name,Age of Onset)    Asthma (1) Maternal Grandmother    Hypertension (1) Father    Cancer - colorectal (1) Maternal Grandfather           Negative family history of: C.A.D., Diabetes, Cerebrovascular Disease, Breast Cancer, Prostate Cancer              Current Outpatient Medications   Medication Sig Dispense Refill    albuterol (PROAIR HFA/PROVENTIL HFA/VENTOLIN HFA) 108 (90 Base) MCG/ACT inhaler Inhale 2 puffs into the lungs every 4 hours as needed for shortness of breath / dyspnea 1 Inhaler 3    buPROPion (WELLBUTRIN XL) 150 MG 24 hr tablet Take 2 tablets (300 mg) by mouth every morning.  "90 tablet 3    cetirizine (ZYRTEC) 10 MG tablet Take 10 mg by mouth daily.      levonorgestrel (RANDALL) 13.5 MG IUD 1 each by Intrauterine route once       No current facility-administered medications for this visit.     Allergies   Allergen Reactions    Cats     Mold     Nkda [No Known Drug Allergy]        Past medical, surgical, social and family histories were reviewed and updated in EPIC.    ROS:   12 point review of systems negative other than symptoms noted below or in the HPI.  No urinary frequency or dysuria, bladder or kidney problems    EXAM:  /76   Ht 1.626 m (5' 4\")   Wt 114.3 kg (252 lb)   LMP 09/17/2024 (Approximate)   BMI 43.26 kg/m     BMI: Body mass index is 43.26 kg/m .    PHYSICAL EXAM:  Constitutional:   Appearance: Well nourished, well developed, alert, in no acute distress  Chest:  Respiratory Effort:  Breathing unlabored  Cardiovascular:    Heart: Auscultation:  Regular rate, normal rhythm, no murmurs present  Breasts: Inspection of Breasts:  No lymphadenopathy present., Palpation of Breasts and Axillae:  No masses present on palpation, no breast tenderness., Axillary Lymph Nodes:  No lymphadenopathy present., and No nodularity, asymmetry or nipple discharge bilaterally.  Lymphatic: Lymph Nodes:  No other lymphadenopathy present  Skin:  General Inspection:  No rashes present, no lesions present, no areas of  discoloration  Neurologic:    Mental Status:  Oriented X3.  Normal strength and tone, sensory exam                grossly normal, mentation intact and speech normal.    Psychiatric:   Mentation appears normal and affect normal/bright.         Pelvic Exam:  External Genitalia:     Normal appearance for age, no discharge present, no tenderness present, no inflammatory lesions present, color normal  Vagina:    Normal vaginal vault without central or paravaginal defects, no discharge present, no inflammatory lesions present, no masses present  Bladder:     Nontender to " palpation  Urethra:   Urethral Body:  Urethra palpation normal, urethra structural support normal   Urethral Meatus:  No erythema or lesions present  Cervix:     Appearance healthy, no lesions present, nontender to palpation, no bleeding present, string present  Uterus:     Nontender to palpation, no masses present, position anteflexed, mobility: normal  Adnexa:     No adnexal tenderness present, no adnexal masses present  Perineum:     Perineum within normal limits, no evidence of trauma, no rashes or skin lesions present  Anus:     Anus within normal limits, no hemorrhoids present  Inguinal Lymph Nodes:     No lymphadenopathy present  Pubic Hair:     Normal pubic hair distribution for age  Genitalia and Groin:     No rashes present, no lesions present, no areas of discoloration, no masses present    COUNSELING:   Special attention given to:        Regular exercise       Healthy diet/nutrition       Contraception    BMI: Body mass index is 43.26 kg/m .  Weight management plan: Discussed healthy diet and exercise guidelines    ASSESSMENT:  34 year old female with satisfactory annual exam.    ICD-10-CM    1. Encounter for gynecological examination without abnormal finding  Z01.419 HPV and Gynecologic Cytology Panel - Recommended Age 30 - 65 Years     [place] PELVIC EXAMINATION      2. Recurrent major depressive disorder, in partial remission (H)  F33.41 buPROPion (WELLBUTRIN XL) 150 MG 24 hr tablet      3. IUD (intrauterine device) in place  Z97.5       4. Screen for STD (sexually transmitted disease)  Z11.3 NEISSERIA GONORRHOEA PCR     CHLAMYDIA TRACHOMATIS PCR     HIV Antigen Antibody Combo     Hepatitis C Antibody      5. Severe dysplasia of cervix (SHAY III)  D06.9 HPV and Gynecologic Cytology Panel - Recommended Age 30 - 65 Years          PLAN:  34-year-old female with a normal GYN exam.  IUD strings were easily visualized.  She is okay to continue on Wellbutrin for 1 year.  Pap smear was collected and we will  follow-up as appropriate.    VAHID Joya CNP

## 2024-10-31 LAB
C TRACH DNA SPEC QL NAA+PROBE: NEGATIVE
HCV AB SERPL QL IA: NONREACTIVE
HIV 1+2 AB+HIV1 P24 AG SERPL QL IA: NONREACTIVE
N GONORRHOEA DNA SPEC QL NAA+PROBE: NEGATIVE

## 2024-11-01 LAB
HPV HR 12 DNA CVX QL NAA+PROBE: NEGATIVE
HPV16 DNA CVX QL NAA+PROBE: NEGATIVE
HPV18 DNA CVX QL NAA+PROBE: NEGATIVE
HUMAN PAPILLOMA VIRUS FINAL DIAGNOSIS: NORMAL

## 2024-11-05 LAB
BKR AP ASSOCIATED HPV REPORT: NORMAL
BKR LAB AP GYN ADEQUACY: NORMAL
BKR LAB AP GYN INTERPRETATION: NORMAL
BKR LAB AP PREVIOUS ABNL DX: NORMAL
BKR LAB AP PREVIOUS ABNORMAL: NORMAL
PATH REPORT.COMMENTS IMP SPEC: NORMAL
PATH REPORT.COMMENTS IMP SPEC: NORMAL
PATH REPORT.RELEVANT HX SPEC: NORMAL